# Patient Record
Sex: MALE | URBAN - METROPOLITAN AREA
[De-identification: names, ages, dates, MRNs, and addresses within clinical notes are randomized per-mention and may not be internally consistent; named-entity substitution may affect disease eponyms.]

---

## 2017-04-07 ENCOUNTER — NEW REFERRAL (OUTPATIENT)
Dept: URBAN - METROPOLITAN AREA CLINIC 87 | Facility: CLINIC | Age: 27
End: 2017-04-07

## 2017-04-07 DIAGNOSIS — H35.463: ICD-10-CM

## 2017-04-07 DIAGNOSIS — H43.393: ICD-10-CM

## 2017-04-07 DIAGNOSIS — H52.203: ICD-10-CM

## 2017-04-07 DIAGNOSIS — H16.223: ICD-10-CM

## 2017-04-07 DIAGNOSIS — H52.13: ICD-10-CM

## 2017-04-07 DIAGNOSIS — M35.00: ICD-10-CM

## 2017-04-07 PROCEDURE — 92225 OPHTHALMOSCOPY (INITIAL): CPT

## 2017-04-07 PROCEDURE — 4040F PNEUMOC VAC/ADMIN/RCVD: CPT | Mod: 8P

## 2017-04-07 PROCEDURE — 92134 CPTRZ OPH DX IMG PST SGM RTA: CPT

## 2017-04-07 PROCEDURE — 99244 OFF/OP CNSLTJ NEW/EST MOD 40: CPT

## 2017-04-07 ASSESSMENT — VISUAL ACUITY
OS_CC: 20/25
OD_CC: 20/20
OD_PH: 20/20-2
OS_CC: 20/30-1
OD_CC: 20/30-1
OS_PH: 20/20

## 2017-04-07 ASSESSMENT — TONOMETRY
OD_IOP_MMHG: 17
OS_IOP_MMHG: 15

## 2018-01-26 ENCOUNTER — PROBLEM (OUTPATIENT)
Dept: URBAN - METROPOLITAN AREA CLINIC 87 | Facility: CLINIC | Age: 28
End: 2018-01-26

## 2018-01-26 DIAGNOSIS — H33.311: ICD-10-CM

## 2018-01-26 DIAGNOSIS — H43.393: ICD-10-CM

## 2018-01-26 DIAGNOSIS — H35.463: ICD-10-CM

## 2018-01-26 DIAGNOSIS — H53.19: ICD-10-CM

## 2018-01-26 DIAGNOSIS — H16.223: ICD-10-CM

## 2018-01-26 DIAGNOSIS — H35.412: ICD-10-CM

## 2018-01-26 DIAGNOSIS — H52.203: ICD-10-CM

## 2018-01-26 DIAGNOSIS — H52.13: ICD-10-CM

## 2018-01-26 DIAGNOSIS — M35.00: ICD-10-CM

## 2018-01-26 PROCEDURE — G8427 DOCREV CUR MEDS BY ELIG CLIN: HCPCS

## 2018-01-26 PROCEDURE — 1036F TOBACCO NON-USER: CPT

## 2018-01-26 PROCEDURE — 4040F PNEUMOC VAC/ADMIN/RCVD: CPT | Mod: 8P

## 2018-01-26 PROCEDURE — 92226 OPHTHALMOSCOPY (SUB): CPT

## 2018-01-26 PROCEDURE — 92014 COMPRE OPH EXAM EST PT 1/>: CPT

## 2018-01-26 ASSESSMENT — VISUAL ACUITY
OD_CC: 20/20
OD_CC: 20/20
OS_CC: 20/20-2

## 2018-01-26 ASSESSMENT — TONOMETRY
OD_IOP_MMHG: 21
OS_IOP_MMHG: 20

## 2018-04-23 LAB — HCV AB SER-ACNC: NEGATIVE

## 2020-07-25 ENCOUNTER — APPOINTMENT (EMERGENCY)
Dept: RADIOLOGY | Facility: HOSPITAL | Age: 30
End: 2020-07-25

## 2020-07-25 ENCOUNTER — HOSPITAL ENCOUNTER (EMERGENCY)
Facility: HOSPITAL | Age: 30
Discharge: HOME | End: 2020-07-25
Attending: EMERGENCY MEDICINE | Admitting: EMERGENCY MEDICINE

## 2020-07-25 VITALS
TEMPERATURE: 98.3 F | WEIGHT: 146.83 LBS | RESPIRATION RATE: 16 BRPM | OXYGEN SATURATION: 100 % | BODY MASS INDEX: 21.75 KG/M2 | DIASTOLIC BLOOD PRESSURE: 86 MMHG | SYSTOLIC BLOOD PRESSURE: 126 MMHG | HEART RATE: 90 BPM | HEIGHT: 69 IN

## 2020-07-25 DIAGNOSIS — R55 NEAR SYNCOPE: ICD-10-CM

## 2020-07-25 DIAGNOSIS — R42 LIGHTHEADEDNESS: ICD-10-CM

## 2020-07-25 DIAGNOSIS — F41.9 ANXIETY: Primary | ICD-10-CM

## 2020-07-25 LAB
ALBUMIN SERPL BCP-MCNC: 4.5 G/DL (ref 3.5–5)
ALP SERPL-CCNC: 54 U/L (ref 46–116)
ALT SERPL W P-5'-P-CCNC: 45 U/L (ref 12–78)
ANION GAP SERPL CALCULATED.3IONS-SCNC: 10 MMOL/L (ref 4–13)
AST SERPL W P-5'-P-CCNC: 29 U/L (ref 5–45)
ATRIAL RATE: 92 BPM
ATRIAL RATE: 98 BPM
BASOPHILS # BLD AUTO: 0.02 THOUSANDS/ΜL (ref 0–0.1)
BASOPHILS NFR BLD AUTO: 0 % (ref 0–1)
BILIRUB SERPL-MCNC: 0.4 MG/DL (ref 0.2–1)
BUN SERPL-MCNC: 6 MG/DL (ref 5–25)
CALCIUM SERPL-MCNC: 9.7 MG/DL (ref 8.3–10.1)
CHLORIDE SERPL-SCNC: 104 MMOL/L (ref 100–108)
CO2 SERPL-SCNC: 26 MMOL/L (ref 21–32)
CREAT SERPL-MCNC: 1.04 MG/DL (ref 0.6–1.3)
EOSINOPHIL # BLD AUTO: 0.17 THOUSAND/ΜL (ref 0–0.61)
EOSINOPHIL NFR BLD AUTO: 4 % (ref 0–6)
ERYTHROCYTE [DISTWIDTH] IN BLOOD BY AUTOMATED COUNT: 12.3 % (ref 11.6–15.1)
GFR SERPL CREATININE-BSD FRML MDRD: 96 ML/MIN/1.73SQ M
GLUCOSE SERPL-MCNC: 82 MG/DL (ref 65–140)
HCT VFR BLD AUTO: 46.2 % (ref 36.5–49.3)
HGB BLD-MCNC: 15.6 G/DL (ref 12–17)
IMM GRANULOCYTES # BLD AUTO: 0.02 THOUSAND/UL (ref 0–0.2)
IMM GRANULOCYTES NFR BLD AUTO: 0 % (ref 0–2)
LYMPHOCYTES # BLD AUTO: 1.38 THOUSANDS/ΜL (ref 0.6–4.47)
LYMPHOCYTES NFR BLD AUTO: 28 % (ref 14–44)
MAGNESIUM SERPL-MCNC: 2.1 MG/DL (ref 1.6–2.6)
MCH RBC QN AUTO: 32 PG (ref 26.8–34.3)
MCHC RBC AUTO-ENTMCNC: 33.8 G/DL (ref 31.4–37.4)
MCV RBC AUTO: 95 FL (ref 82–98)
MONOCYTES # BLD AUTO: 0.38 THOUSAND/ΜL (ref 0.17–1.22)
MONOCYTES NFR BLD AUTO: 8 % (ref 4–12)
NEUTROPHILS # BLD AUTO: 2.92 THOUSANDS/ΜL (ref 1.85–7.62)
NEUTS SEG NFR BLD AUTO: 60 % (ref 43–75)
NRBC BLD AUTO-RTO: 0 /100 WBCS
P AXIS: 76 DEGREES
P AXIS: 77 DEGREES
PHOSPHATE SERPL-MCNC: 2.1 MG/DL (ref 2.7–4.5)
PLATELET # BLD AUTO: 307 THOUSANDS/UL (ref 149–390)
PMV BLD AUTO: 10.4 FL (ref 8.9–12.7)
POTASSIUM SERPL-SCNC: 3.9 MMOL/L (ref 3.5–5.3)
PR INTERVAL: 156 MS
PR INTERVAL: 158 MS
PROT SERPL-MCNC: 8.4 G/DL (ref 6.4–8.2)
QRS AXIS: 78 DEGREES
QRS AXIS: 79 DEGREES
QRSD INTERVAL: 84 MS
QRSD INTERVAL: 86 MS
QT INTERVAL: 320 MS
QT INTERVAL: 324 MS
QTC INTERVAL: 395 MS
QTC INTERVAL: 413 MS
RBC # BLD AUTO: 4.88 MILLION/UL (ref 3.88–5.62)
SODIUM SERPL-SCNC: 140 MMOL/L (ref 136–145)
T WAVE AXIS: 61 DEGREES
T WAVE AXIS: 70 DEGREES
TROPONIN I SERPL-MCNC: <0.02 NG/ML
TSH SERPL DL<=0.05 MIU/L-ACNC: 1.34 UIU/ML (ref 0.36–3.74)
VENTRICULAR RATE: 92 BPM
VENTRICULAR RATE: 98 BPM
WBC # BLD AUTO: 4.89 THOUSAND/UL (ref 4.31–10.16)

## 2020-07-25 PROCEDURE — 96374 THER/PROPH/DIAG INJ IV PUSH: CPT

## 2020-07-25 PROCEDURE — 93005 ELECTROCARDIOGRAM TRACING: CPT

## 2020-07-25 PROCEDURE — 93010 ELECTROCARDIOGRAM REPORT: CPT | Performed by: INTERNAL MEDICINE

## 2020-07-25 PROCEDURE — 84100 ASSAY OF PHOSPHORUS: CPT | Performed by: EMERGENCY MEDICINE

## 2020-07-25 PROCEDURE — 85025 COMPLETE CBC W/AUTO DIFF WBC: CPT | Performed by: EMERGENCY MEDICINE

## 2020-07-25 PROCEDURE — 84443 ASSAY THYROID STIM HORMONE: CPT | Performed by: EMERGENCY MEDICINE

## 2020-07-25 PROCEDURE — 83735 ASSAY OF MAGNESIUM: CPT | Performed by: EMERGENCY MEDICINE

## 2020-07-25 PROCEDURE — 36415 COLL VENOUS BLD VENIPUNCTURE: CPT | Performed by: EMERGENCY MEDICINE

## 2020-07-25 PROCEDURE — 99285 EMERGENCY DEPT VISIT HI MDM: CPT | Performed by: EMERGENCY MEDICINE

## 2020-07-25 PROCEDURE — 80053 COMPREHEN METABOLIC PANEL: CPT | Performed by: EMERGENCY MEDICINE

## 2020-07-25 PROCEDURE — 71045 X-RAY EXAM CHEST 1 VIEW: CPT

## 2020-07-25 PROCEDURE — 96361 HYDRATE IV INFUSION ADD-ON: CPT

## 2020-07-25 PROCEDURE — 99284 EMERGENCY DEPT VISIT MOD MDM: CPT

## 2020-07-25 PROCEDURE — 84484 ASSAY OF TROPONIN QUANT: CPT | Performed by: EMERGENCY MEDICINE

## 2020-07-25 RX ORDER — LORAZEPAM 2 MG/ML
0.5 INJECTION INTRAMUSCULAR ONCE
Status: COMPLETED | OUTPATIENT
Start: 2020-07-25 | End: 2020-07-25

## 2020-07-25 RX ORDER — MECLIZINE HYDROCHLORIDE 25 MG/1
25 TABLET ORAL ONCE
Status: COMPLETED | OUTPATIENT
Start: 2020-07-25 | End: 2020-07-25

## 2020-07-25 RX ORDER — SERTRALINE HYDROCHLORIDE 25 MG/1
25 TABLET, FILM COATED ORAL DAILY
COMMUNITY

## 2020-07-25 RX ADMIN — SODIUM CHLORIDE 1000 ML: 0.9 INJECTION, SOLUTION INTRAVENOUS at 11:35

## 2020-07-25 RX ADMIN — LORAZEPAM 0.5 MG: 2 INJECTION INTRAMUSCULAR; INTRAVENOUS at 13:35

## 2020-07-25 RX ADMIN — MECLIZINE HYDROCHLORIDE 25 MG: 25 TABLET ORAL at 11:32

## 2020-07-25 NOTE — ED PROVIDER NOTES
History  Chief Complaint   Patient presents with    Dizziness     Patient presents to ED with co "dizziness, light headed, shaky, feeling like I am going to pass out " Patient reports having 5 beers last night and waking up feeling like this  28 y/o male presents to the ED for dizziness x last few hours  Patient states that he has had intermittent lightheadedness and anxiety  States that he feels like he is going to pass out  He denies any n/v, cp, sob, abd pain, urinary symptoms, d/c, cough, or f/c  He reports that he drank 5-6 beers last night  States that he is a social drinker  He does report that he has a history of similar symptoms in the past and was seen at another hospital 1 week ago for the same  He states that the symptoms were after drinking, similar to this episode  He states that he had blood work done at that time which was unremarkable  He has not tried anything for the symptoms  No other complaints  History provided by:  Patient  Dizziness   Quality:  Lightheadedness  Severity:  Moderate  Onset quality:  Sudden  Timing:  Constant  Progression:  Unchanged  Chronicity:  Recurrent  Context: standing up    Relieved by:  None tried  Worsened by:  Standing up and movement  Ineffective treatments:  None tried  Associated symptoms: no chest pain, no diarrhea, no headaches, no nausea, no shortness of breath, no vomiting and no weakness        Prior to Admission Medications   Prescriptions Last Dose Informant Patient Reported? Taking?   sertraline (ZOLOFT) 25 mg tablet   Yes Yes   Sig: Take 25 mg by mouth daily      Facility-Administered Medications: None       Past Medical History:   Diagnosis Date    Sjogren's syndrome ([facility] [address] )        History reviewed  No pertinent surgical history  History reviewed  No pertinent family history  I have reviewed and agree with the history as documented      E-Cigarette/Vaping    E-Cigarette Use Never User      E-Cigarette/Vaping Substances     Social History Tobacco Use    Smoking status: Never Smoker    Smokeless tobacco: Never Used   Substance Use Topics    Alcohol use: Yes     Frequency: Monthly or less    Drug use: Never       Review of Systems   Constitutional: Negative for chills and fever  HENT: Negative for congestion, ear pain and sore throat  Eyes: Negative for pain and visual disturbance  Respiratory: Negative for cough, shortness of breath and wheezing  Cardiovascular: Negative for chest pain and leg swelling  Gastrointestinal: Negative for abdominal pain, diarrhea, nausea and vomiting  Genitourinary: Negative for dysuria, frequency, hematuria and urgency  Musculoskeletal: Negative for neck pain and neck stiffness  Skin: Negative for rash and wound  Neurological: Positive for dizziness and light-headedness  Negative for weakness, numbness and headaches  Psychiatric/Behavioral: Negative for agitation and confusion  The patient is nervous/anxious  All other systems reviewed and are negative  Physical Exam  Physical Exam   Constitutional: He is oriented to person, place, and time  He appears well-developed and well-nourished  HENT:   Head: Normocephalic and atraumatic  No nystagmus    Eyes: Pupils are equal, round, and reactive to light  EOM are normal    Neck: Normal range of motion  Neck supple  Cardiovascular: Normal rate and regular rhythm  Pulmonary/Chest: Effort normal and breath sounds normal    Abdominal: Soft  Bowel sounds are normal    Musculoskeletal: Normal range of motion  Neurological: He is alert and oriented to person, place, and time  No focal deficits   Skin: Skin is warm and dry  Nursing note and vitals reviewed        Vital Signs  ED Triage Vitals [07/25/20 1104]   Temperature Pulse Respirations Blood Pressure SpO2   98 3 °F (36 8 °C) (!) 106 17 (!) 152/103 100 %      Temp Source Heart Rate Source Patient Position - Orthostatic VS BP Location FiO2 (%)   Oral Monitor Sitting Right arm -- Pain Score       --           Vitals:    07/25/20 1315 07/25/20 1345 07/25/20 1400 07/25/20 1430   BP: 129/82 126/84 124/87 126/86   Pulse: 86 83 87 90   Patient Position - Orthostatic VS: Lying Lying Sitting          Visual Acuity  Visual Acuity      Most Recent Value   L Pupil Size (mm)  2   R Pupil Size (mm)  2          ED Medications  Medications   meclizine (ANTIVERT) tablet 25 mg (25 mg Oral Given 7/25/20 1132)   sodium chloride 0 9 % bolus 1,000 mL (0 mL Intravenous Stopped 7/25/20 1335)   LORazepam (ATIVAN) injection 0 5 mg (0 5 mg Intravenous Given 7/25/20 1335)       Diagnostic Studies  Results Reviewed     Procedure Component Value Units Date/Time    Magnesium [[id number]]  (Normal) Collected:  07/25/20 1132    Lab Status:  Final result Specimen:  Blood from Arm, Right Updated:  07/25/20 1211     Magnesium 2 1 mg/dL     Phosphorus [[id number]]  (Abnormal) Collected:  07/25/20 1132    Lab Status:  Final result Specimen:  Blood from Arm, Right Updated:  07/25/20 1211     Phosphorus 2 1 mg/dL     TSH, 3rd generation with Free T4 reflex [[id number]]  (Normal) Collected:  07/25/20 1132    Lab Status:  Final result Specimen:  Blood from Arm, Right Updated:  07/25/20 1211     TSH 3RD GENERATON 1 345 uIU/mL     Narrative:       Patients undergoing fluorescein dye angiography may retain small amounts of fluorescein in the body for 48-72 hours post procedure  Samples containing fluorescein can produce falsely depressed TSH values  If the patient had this procedure,a specimen should be resubmitted post fluorescein clearance        Troponin I [[id number]]  (Normal) Collected:  07/25/20 1132    Lab Status:  Final result Specimen:  Blood from Arm, Right Updated:  07/25/20 1204     Troponin I <0 02 ng/mL     Comprehensive metabolic panel [[id number]]  (Abnormal) Collected:  07/25/20 1132    Lab Status:  Final result Specimen:  Blood from Arm, Right Updated:  07/25/20 1202     Sodium 140 mmol/L      Potassium 3 9 mmol/L Chloride 104 mmol/L      CO2 26 mmol/L      ANION GAP 10 mmol/L      BUN 6 mg/dL      Creatinine 1 04 mg/dL      Glucose 82 mg/dL      Calcium 9 7 mg/dL      AST 29 U/L      ALT 45 U/L      Alkaline Phosphatase 54 U/L      Total Protein 8 4 g/dL      Albumin 4 5 g/dL      Total Bilirubin 0 40 mg/dL      eGFR 96 ml/min/1 73sq m     Narrative:       National Kidney Disease Foundation guidelines for Chronic Kidney Disease (CKD):     Stage 1 with normal or high GFR (GFR > 90 mL/min/1 73 square meters)    Stage 2 Mild CKD (GFR = 60-89 mL/min/1 73 square meters)    Stage 3A Moderate CKD (GFR = 45-59 mL/min/1 73 square meters)    Stage 3B Moderate CKD (GFR = 30-44 mL/min/1 73 square meters)    Stage 4 Severe CKD (GFR = 15-29 mL/min/1 73 square meters)    Stage 5 End Stage CKD (GFR <15 mL/min/1 73 square meters)  Note: GFR calculation is accurate only with a steady state creatinine    CBC and differential [[id number]] Collected:  07/25/20 1132    Lab Status:  Final result Specimen:  Blood from Arm, Right Updated:  07/25/20 1144     WBC 4 89 Thousand/uL      RBC 4 88 Million/uL      Hemoglobin 15 6 g/dL      Hematocrit 46 2 %      MCV 95 fL      MCH 32 0 pg      MCHC 33 8 g/dL      RDW 12 3 %      MPV 10 4 fL      Platelets 561 Thousands/uL      nRBC 0 /100 WBCs      Neutrophils Relative 60 %      Immat GRANS % 0 %      Lymphocytes Relative 28 %      Monocytes Relative 8 %      Eosinophils Relative 4 %      Basophils Relative 0 %      Neutrophils Absolute 2 92 Thousands/µL      Immature Grans Absolute 0 02 Thousand/uL      Lymphocytes Absolute 1 38 Thousands/µL      Monocytes Absolute 0 38 Thousand/µL      Eosinophils Absolute 0 17 Thousand/µL      Basophils Absolute 0 02 Thousands/µL                  XR chest portable   ED Interpretation by Lissett Jansen DO (07/25 8747)   NAP       Final Result by Francesca Tipton MD (07/25 2669)      No acute cardiopulmonary disease              Workstation performed: [id number]                    Procedures  ECG 12 Lead Documentation Only  Date/Time: 7/25/2020 11:24 AM  Performed by: Zane Amato DO  Authorized by: Zane Amato DO     Patient location:  ED  Rate:     ECG rate:  98    ECG rate assessment: normal    Rhythm:     Rhythm: sinus rhythm    Ectopy:     Ectopy: none    QRS:     QRS axis:  Normal    QRS intervals:  Normal  ST segments:     ST segments:  Normal  T waves:     T waves: normal               ED Course       US AUDIT      Most Recent Value   Initial Alcohol Screen: US AUDIT-C    1  How often do you have a drink containing alcohol? 1 Filed at: 07/25/2020 1103   2  How many drinks containing alcohol do you have on a typical day you are drinking? 0 Filed at: 07/25/2020 1103   3a  Male UNDER 65: How often do you have five or more drinks on one occasion? 1 Filed at: 07/25/2020 1103   Audit-C Score  2 Filed at: 07/25/2020 1103                  VIRGILIO/DAST-10      Most Recent Value   How many times in the past year have you    Used an illegal drug or used a prescription medication for non-medical reasons? Never Filed at: 07/25/2020 1103                                MDM  Number of Diagnoses or Management Options  Anxiety: new and requires workup  Lightheadedness: new and requires workup  Near syncope: new and requires workup  Diagnosis management comments: Patient with lightheadedness and near syncope likely secondary to dehydration from alcohol yesterday  Will get labs, EKG, TSH, and trial meclizine and fluids for symptom relief  Patient reevaluated and feels improved  Patient updated on results of tests  Discharge instructions given including follow-up, and return precautions  Patient demonstrates verbal understanding and agrees with plan  Patient instructed not to drink alcohol 2/2 recurrent symptoms after drinking          Amount and/or Complexity of Data Reviewed  Clinical lab tests: ordered and reviewed  Tests in the radiology section of CPT®: ordered and reviewed  Tests in the medicine section of CPT®: ordered and reviewed  Discussion of test results with the performing providers: yes  Decide to obtain previous medical records or to obtain history from someone other than the patient: yes  Obtain history from someone other than the patient: yes  Review and summarize past medical records: yes  Discuss the patient with other providers: yes  Independent visualization of images, tracings, or specimens: yes    Patient Progress  Patient progress: improved        Disposition  Final diagnoses:   Anxiety   Near syncope   Lightheadedness     Time reflects when diagnosis was documented in both MDM as applicable and the Disposition within this note     Time User Action Codes Description Comment    7/25/2020  2:29 PM [place] Com[place] Add [F41 9] Anxiety     7/25/2020  2:29 PM [place] Com[place] Add [R55] Near syncope     7/25/2020  2:29 PM [facility], [facility] [R42] [address] [facility]       ED Disposition     ED Disposition Condition Date/Time Comment    Discharge Stable Sat Jul 25, 2020  2:28 PM [place] Shirley discharge to home/self care  Follow-up Information     Follow up With Specialties Details Why Contact Info Additional Information    your family doctor  Go to  on Tuesday, as scheduled      [facility] Emergency Department Emergency Medicine Go to  immediately for any new or worsening symptoms [address] [facility] [address] ED, [address] [place], [place], [address]          Discharge Medication List as of 7/25/2020  2:30 PM      CONTINUE these medications which have NOT CHANGED    Details   sertraline (ZOLOFT) 25 mg tablet Take 25 mg by mouth daily, Historical Med           No discharge procedures on file      PDMP Review     None          ED Provider  Electronically Signed by           Mary Jo Foss DO  07/27/20 0105

## 2020-07-25 NOTE — ED NOTES
Patient states medications did not help symptoms  Dr Cherylene Prudent made aware        Aleksander Grigsby RN  07/25/20 4846

## 2023-08-09 ENCOUNTER — HOSPITAL ENCOUNTER (OUTPATIENT)
Dept: ULTRASOUND IMAGING | Facility: HOSPITAL | Age: 33
Discharge: HOME | End: 2023-08-09
Payer: COMMERCIAL

## 2023-08-09 DIAGNOSIS — R59.0 CERVICAL LYMPHADENOPATHY: ICD-10-CM

## 2023-08-09 PROCEDURE — 76536 US EXAM OF HEAD AND NECK: CPT

## 2023-08-15 ENCOUNTER — OFFICE VISIT (OUTPATIENT)
Age: 33
End: 2023-08-15
Payer: COMMERCIAL

## 2023-08-15 VITALS
RESPIRATION RATE: 18 BRPM | TEMPERATURE: 96.8 F | HEART RATE: 71 BPM | BODY MASS INDEX: 22.95 KG/M2 | DIASTOLIC BLOOD PRESSURE: 88 MMHG | WEIGHT: 155.4 LBS | OXYGEN SATURATION: 100 % | SYSTOLIC BLOOD PRESSURE: 122 MMHG

## 2023-08-15 DIAGNOSIS — H66.91 RIGHT OTITIS MEDIA, UNSPECIFIED OTITIS MEDIA TYPE: Primary | ICD-10-CM

## 2023-08-15 DIAGNOSIS — H60.501 ACUTE OTITIS EXTERNA OF RIGHT EAR, UNSPECIFIED TYPE: ICD-10-CM

## 2023-08-15 PROCEDURE — 99213 OFFICE O/P EST LOW 20 MIN: CPT | Performed by: EMERGENCY MEDICINE

## 2023-08-15 RX ORDER — AMOXICILLIN 500 MG/1
500 CAPSULE ORAL 3 TIMES DAILY
Qty: 21 CAPSULE | Refills: 0 | Status: SHIPPED | OUTPATIENT
Start: 2023-08-15 | End: 2023-08-22

## 2023-08-15 RX ORDER — FLUTICASONE PROPIONATE 50 MCG
SPRAY, SUSPENSION (ML) NASAL
COMMUNITY
Start: 2023-08-08

## 2023-08-15 RX ORDER — MELATONIN
1000 DAILY
COMMUNITY

## 2023-08-15 RX ORDER — NEOMYCIN SULFATE, POLYMYXIN B SULFATE AND HYDROCORTISONE 10; 3.5; 1 MG/ML; MG/ML; [USP'U]/ML
4 SUSPENSION/ DROPS AURICULAR (OTIC) 4 TIMES DAILY
Qty: 10 ML | Refills: 0 | Status: SHIPPED | OUTPATIENT
Start: 2023-08-15

## 2023-08-15 NOTE — PROGRESS NOTES
[facility]        NAME: Beverly Arredondo is a 35 y.o. male  : 1990    MRN: [id number]  DATE: August 15, 2023  TIME: 2:41 PM    Assessment and Plan   Right otitis media, unspecified otitis media type [H66.91]  1. Right otitis media, unspecified otitis media type  amoxicillin (AMOXIL) 500 mg capsule    neomycin-polymyxin-hydrocortisone (CORTISPORIN) 0.35%-10,000 units/mL-1% otic suspension      2. Acute otitis externa of right ear, unspecified type  neomycin-polymyxin-hydrocortisone (CORTISPORIN) 0.35%-10,000 units/mL-1% otic suspension            Patient Instructions   Patient Instructions   1. Meds as instructed  2. Tylenol/Motrin for pain  3. F/u with PCP in 2-3 days        Follow up with PCP in 3-5 days. Proceed to  ER if symptoms worsen. Chief Complaint     Chief Complaint   Patient presents with   • Earache     Pt states he has had a right sided earache since this morning. History of Present Illness       26-year-old male with a chief complaint of right ear pain. Patient denies any fever. Review of Systems   Review of Systems   Constitutional: Negative for chills and fever. HENT: Positive for ear pain. Negative for congestion, ear discharge and rhinorrhea. Eyes: Negative for discharge and visual disturbance. Respiratory: Negative for shortness of breath and wheezing. Cardiovascular: Negative for chest pain and palpitations. Gastrointestinal: Negative for abdominal pain and vomiting. Endocrine: Negative for polydipsia and polyuria. Genitourinary: Negative for dysuria and hematuria. Musculoskeletal: Negative for arthralgias, gait problem and neck stiffness. Skin: Negative for rash and wound. Neurological: Negative for dizziness and headaches. Psychiatric/Behavioral: Negative for confusion and suicidal ideas.          Current Medications       Current Outpatient Medications:   •  amoxicillin (AMOXIL) 500 mg capsule, Take 1 capsule (500 mg total) by mouth 3 (three) times a day for 7 days, Disp: 21 capsule, Rfl: 0  •  cholecalciferol (VITAMIN D3) 1,000 units tablet, Take 1,000 Units by mouth daily 5,000 units daily, Disp: , Rfl:   •  cyanocobalamin (VITAMIN B-12) 1000 MCG tablet, Take 100 mcg by mouth daily, Disp: , Rfl:   •  esomeprazole (NexIUM) 20 mg capsule, Take 20 mg by mouth daily, Disp: , Rfl:   •  fluticasone (FLONASE) 50 mcg/act nasal spray, , Disp: , Rfl:   •  Multiple Vitamin (MULTI-VITAMIN) tablet, Take by mouth, Disp: , Rfl:   •  neomycin-polymyxin-hydrocortisone (CORTISPORIN) 0.35%-10,000 units/mL-1% otic suspension, Administer 4 drops to the right ear 4 (four) times a day, Disp: 10 mL, Rfl: 0  •  sertraline (ZOLOFT) 25 mg tablet, Take 25 mg by mouth daily (Patient not taking: Reported on 8/15/2023), Disp: , Rfl:     Current Allergies     Allergies as of 08/15/2023   • (No Known Allergies)            The following portions of the patient's history were reviewed and updated as appropriate: allergies, current medications, past family history, past medical history, past social history, past surgical history and problem list.     Past Medical History:   Diagnosis Date   • Sjogren's syndrome ([address])        History reviewed. No pertinent surgical history. History reviewed. No pertinent family history. Medications have been verified. Objective   /88   Pulse 71   Temp (!) 96.8 °F (36 °C)   Resp 18   Wt 70.5 kg (155 lb 6.4 oz)   SpO2 100%   BMI 22.95 kg/m²        Physical Exam     Physical Exam  Vitals and nursing note reviewed. Constitutional:       Appearance: Normal appearance. Comments: 22-year-old male sitting on exam table complaining of right ear pain   HENT:      Head: Normocephalic and atraumatic. Left Ear: Tympanic membrane, ear canal and external ear normal.      Ears:      Comments: Right ear:  There is mild erythema of the medial canal and some edema of the external canal.  Patient has some tenderness when I insert the otoscope. Nose: Nose normal.   Eyes:      Extraocular Movements: Extraocular movements intact. Conjunctiva/sclera: Conjunctivae normal.   Cardiovascular:      Rate and Rhythm: Normal rate. Pulmonary:      Effort: Pulmonary effort is normal.   Musculoskeletal:         General: Normal range of motion. Cervical back: Neck supple. Skin:     General: Skin is warm and dry. Neurological:      General: No focal deficit present. Mental Status: He is alert and oriented to person, place, and time.    Psychiatric:         Mood and Affect: Mood normal.

## 2023-08-30 ENCOUNTER — TELEPHONE (OUTPATIENT)
Dept: ADMINISTRATIVE | Facility: OTHER | Age: 33
End: 2023-08-30

## 2023-08-30 NOTE — TELEPHONE ENCOUNTER
----- Message from Cait Santiago LPN sent at 2/25/4517 10:12 AM EDT -----  Regarding: Hep C Screening  08/30/23 10:12 AM    Hello, our patient Sonal Taylor has had Hepatitis C completed/performed. Please assist in updating the patient chart by pulling the Care Everywhere (CE) document. The date of service is 04/23/2018.      Thank you,  Cait Santiago LPN  [facility]

## 2023-08-31 NOTE — TELEPHONE ENCOUNTER
Upon review of the In Basket request we were able to locate, review, and update the patient chart as requested for Hepatitis C . Any additional questions or concerns should be emailed to the Practice Liaisons via the appropriate education email address, please do not reply via In Basket.     Thank you  Ludivina Glasgow MA

## 2023-09-05 ENCOUNTER — OFFICE VISIT (OUTPATIENT)
Dept: INTERNAL MEDICINE CLINIC | Facility: CLINIC | Age: 33
End: 2023-09-05
Payer: COMMERCIAL

## 2023-09-05 VITALS
SYSTOLIC BLOOD PRESSURE: 122 MMHG | WEIGHT: 153.4 LBS | HEART RATE: 80 BPM | DIASTOLIC BLOOD PRESSURE: 68 MMHG | RESPIRATION RATE: 16 BRPM | OXYGEN SATURATION: 98 % | HEIGHT: 69 IN | BODY MASS INDEX: 22.72 KG/M2

## 2023-09-05 DIAGNOSIS — E55.9 VITAMIN D DEFICIENCY: ICD-10-CM

## 2023-09-05 DIAGNOSIS — N62 GYNECOMASTIA: ICD-10-CM

## 2023-09-05 DIAGNOSIS — R61 NIGHT SWEATS: ICD-10-CM

## 2023-09-05 DIAGNOSIS — M35.01 SJOGREN'S SYNDROME WITH KERATOCONJUNCTIVITIS SICCA (HCC): Primary | ICD-10-CM

## 2023-09-05 DIAGNOSIS — Z13.220 ENCOUNTER FOR LIPID SCREENING FOR CARDIOVASCULAR DISEASE: ICD-10-CM

## 2023-09-05 DIAGNOSIS — K21.9 GASTROESOPHAGEAL REFLUX DISEASE WITHOUT ESOPHAGITIS: ICD-10-CM

## 2023-09-05 DIAGNOSIS — Z13.1 DIABETES MELLITUS SCREENING: ICD-10-CM

## 2023-09-05 DIAGNOSIS — R59.0 SUBMANDIBULAR LYMPHADENOPATHY: ICD-10-CM

## 2023-09-05 DIAGNOSIS — Z13.6 ENCOUNTER FOR LIPID SCREENING FOR CARDIOVASCULAR DISEASE: ICD-10-CM

## 2023-09-05 DIAGNOSIS — F41.9 ANXIETY: ICD-10-CM

## 2023-09-05 DIAGNOSIS — R22.1 NECK SWELLING: ICD-10-CM

## 2023-09-05 PROCEDURE — 99204 OFFICE O/P NEW MOD 45 MIN: CPT | Performed by: INTERNAL MEDICINE

## 2023-09-05 NOTE — PROGRESS NOTES
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;      PMH for Sjogren's disorder, diagnosed few years ago, involving his eyes; reports associated dry mouth and dry eyes, uses eye drops, cannot use artifical saliva as it causes him too much saliva, says his symptoms are stable for now; has GERD and will be having an EGD next month, taking Nexium prn;    Reports L submandibular lymphadenopathy and nightly sweats, US done in the past and normal; denies any fever; no other lymphadenopathy on exam; recent illness noted; will obtain CT neck, check labs including cbc, CMP, TSH, a1c, lipid, vit d, urine;     US breasts and mammogram for b/l breast swelling; says his chest has always been large, dad has similar issue;     Quality Measures:     Depression Screening and Follow-up Plan: Patient was screened for depression during today's encounter. They screened negative with a PHQ-2 score of 2. Return in about 2 months (around 11/5/2023). No problem-specific Assessment & Plan notes found for this encounter. Diagnoses and all orders for this visit:    Sjogren's syndrome with keratoconjunctivitis sicca (HCC)  -     CBC and differential; Future  -     Comprehensive metabolic panel; Future  -     CT soft tissue neck w contrast; Future    Gastroesophageal reflux disease without esophagitis  -     CBC and differential; Future    Night sweats  -     TSH, 3rd generation with Free T4 reflex; Future  -     Urinalysis, Screen  -     CT soft tissue neck w contrast; Future    Vitamin D deficiency  -     Vitamin D 25 hydroxy; Future    Neck swelling  -     CT soft tissue neck w contrast; Future    Diabetes mellitus screening  -     Hemoglobin A1C; Future    Anxiety  -     TSH, 3rd generation with Free T4 reflex;  Future    Encounter for lipid screening for cardiovascular disease  -     Lipid Panel with Direct LDL reflex; Future    Submandibular lymphadenopathy  -     CT soft tissue neck w contrast; Future    Gynecomastia  -     Mammo diagnostic bilateral w cad; Future          Subjective:      Patient ID: Tory Welch is a 35 y.o. male. Here to establish c      ALLERGIES:  No Known Allergies    CURRENT MEDICATIONS:    Current Outpatient Medications:   •  cholecalciferol (VITAMIN D3) 1,000 units tablet, Take 1,000 Units by mouth daily 5,000 units daily, Disp: , Rfl:   •  esomeprazole (NexIUM) 20 mg capsule, Take 20 mg by mouth daily, Disp: , Rfl:   •  fluticasone (FLONASE) 50 mcg/act nasal spray, , Disp: , Rfl:   •  Multiple Vitamin (MULTI-VITAMIN) tablet, Take by mouth, Disp: , Rfl:   •  neomycin-polymyxin-hydrocortisone (CORTISPORIN) 0.35%-10,000 units/mL-1% otic suspension, Administer 4 drops to the right ear 4 (four) times a day, Disp: 10 mL, Rfl: 0    ACTIVE PROBLEM LIST:  Patient Active Problem List   Diagnosis   • GERD (gastroesophageal reflux disease)   • Sjogren's syndrome (HCC)       PAST MEDICAL HISTORY:  Past Medical History:   Diagnosis Date   • Allergic    • Anxiety    • GERD (gastroesophageal reflux disease)    • Sjogren's syndrome ([address])        PAST SURGICAL HISTORY:  History reviewed. No pertinent surgical history. FAMILY HISTORY:  History reviewed. No pertinent family history. SOCIAL HISTORY:  Social History     Socioeconomic History   • Marital status: Single     Spouse name: Not on file   • Number of children: Not on file   • Years of education: Not on file   • Highest education level: Not on file   Occupational History   • Not on file   Tobacco Use   • Smoking status: Never   • Smokeless tobacco: Never   Vaping Use   • Vaping Use: Never used   Substance and Sexual Activity   • Alcohol use:  Yes     Alcohol/week: 8.0 standard drinks of alcohol     Types: 8 Cans of beer per week     Comment: socially   • Drug use: Never   • Sexual activity: Yes     Partners: Female     Birth control/protection: Condom Female Other Topics Concern   • Not on file   Social History Narrative   • Not on file     Social Determinants of Health     Financial Resource Strain: Not on file   Food Insecurity: Not on file   Transportation Needs: Not on file   Physical Activity: Not on file   Stress: Not on file   Social Connections: Not on file   Intimate Partner Violence: Not on file   Housing Stability: Not on file       Review of Systems   Constitutional: Positive for chills (night sweats). Negative for fever. HENT: Negative for ear pain and sore throat. Neck swelling   Eyes: Negative for pain and visual disturbance. Dry eyes   Dry mouth     Respiratory: Negative for cough and shortness of breath. Cardiovascular: Negative for chest pain and palpitations. Gastrointestinal: Negative for abdominal pain and vomiting. Reflux symptoms   Genitourinary: Negative for dysuria and hematuria. Musculoskeletal: Negative for arthralgias and back pain. Skin: Negative for color change and rash. Neurological: Negative for seizures and syncope. Psychiatric/Behavioral: The patient is nervous/anxious. All other systems reviewed and are negative. Objective:  Vitals:    09/05/23 1125   BP: 122/68   BP Location: Left arm   Patient Position: Sitting   Cuff Size: Adult   Pulse: 80   Resp: 16   SpO2: 98%   Weight: 69.6 kg (153 lb 6.4 oz)   Height: 5' 9" (1.753 m)     Body mass index is 22.65 kg/m². Physical Exam  Vitals and nursing note reviewed. Constitutional:       Appearance: Normal appearance. He is normal weight. HENT:      Head: Normocephalic and atraumatic. Right Ear: Tympanic membrane normal.      Left Ear: Tympanic membrane normal.      Nose: Congestion present. Mouth/Throat:      Mouth: Mucous membranes are moist.   Eyes:      Conjunctiva/sclera: Conjunctivae normal.      Pupils: Pupils are equal, round, and reactive to light. Neck:      Vascular: No carotid bruit.       Comments: Submandibular lymphadenopathy L > R  Cardiovascular:      Rate and Rhythm: Normal rate and regular rhythm. Heart sounds: Normal heart sounds. Pulmonary:      Effort: Pulmonary effort is normal.      Breath sounds: Normal breath sounds. Chest:   Breasts:     Right: Swelling present. Left: Swelling present. Abdominal:      General: Abdomen is flat. Bowel sounds are normal.      Palpations: Abdomen is soft. Musculoskeletal:         General: Normal range of motion. Cervical back: Normal range of motion. Lymphadenopathy:      Head:      Right side of head: Submandibular adenopathy present. Left side of head: Submandibular adenopathy present. Upper Body:      Right upper body: No axillary adenopathy. Left upper body: No axillary adenopathy. Lower Body: No right inguinal adenopathy. No left inguinal adenopathy. Skin:     General: Skin is warm and dry. Neurological:      General: No focal deficit present. Mental Status: He is alert and oriented to person, place, and time. Mental status is at baseline. Psychiatric:         Mood and Affect: Mood normal.         Behavior: Behavior normal.           RESULTS:  Hemoglobin A1C   Date/Time Value Ref Range Status   04/23/2018 09:29 AM 5.0 4.0 - 6.4 % Final     Comment:       The use of HbA1c to monitor glycemic status is   based on normal hemoglobin and HbA composition. This test should not be used in patients with   abnormal hemoglobin that affects the half life   of the red blood cell or the in vivo glycation   rates.      Hemoglobin   Date/Time Value Ref Range Status   07/25/2020 11:32 AM 15.6 12.0 - 17.0 g/dL Final     Hematocrit   Date/Time Value Ref Range Status   07/25/2020 11:32 AM 46.2 36.5 - 49.3 % Final     Platelets   Date/Time Value Ref Range Status   07/25/2020 11:32  149 - 390 Thousands/uL Final     TSH 3RD GENERATON   Date/Time Value Ref Range Status   07/25/2020 11:32 AM 1.345 0.358 - 3.740 uIU/mL Final     Comment: Using supplements with high doses of biotin 20 to more than 300 times greater than the adequate daily intake for adults of 30 mcg/day as established by the [place] [employer], can cause falsely depress results. Sodium   Date/Time Value Ref Range Status   07/25/2020 11:32  136 - 145 mmol/L Final     BUN   Date/Time Value Ref Range Status   07/25/2020 11:32 AM 6 5 - 25 mg/dL Final     Creatinine   Date/Time Value Ref Range Status   07/25/2020 11:32 AM 1.04 0.60 - 1.30 mg/dL Final     Comment:     Standardized to IDMS reference method      In chart    This note was created with voice recognition software. Phonic, grammatical and spelling errors may be present within the note as a result.

## 2023-09-14 ENCOUNTER — HOSPITAL ENCOUNTER (OUTPATIENT)
Dept: CT IMAGING | Facility: HOSPITAL | Age: 33
End: 2023-09-14
Payer: COMMERCIAL

## 2023-09-14 DIAGNOSIS — M35.01 SJOGREN'S SYNDROME WITH KERATOCONJUNCTIVITIS SICCA (HCC): ICD-10-CM

## 2023-09-14 DIAGNOSIS — R61 NIGHT SWEATS: ICD-10-CM

## 2023-09-14 DIAGNOSIS — R59.0 SUBMANDIBULAR LYMPHADENOPATHY: ICD-10-CM

## 2023-09-14 DIAGNOSIS — R22.1 NECK SWELLING: ICD-10-CM

## 2023-09-14 PROCEDURE — 70491 CT SOFT TISSUE NECK W/DYE: CPT

## 2023-09-14 PROCEDURE — G1004 CDSM NDSC: HCPCS

## 2023-09-14 RX ADMIN — IOHEXOL 85 ML: 350 INJECTION, SOLUTION INTRAVENOUS at 14:29

## 2023-10-20 ENCOUNTER — RA CDI HCC (OUTPATIENT)
Dept: OTHER | Facility: HOSPITAL | Age: 33
End: 2023-10-20

## 2024-01-08 ENCOUNTER — OFFICE VISIT (OUTPATIENT)
Dept: INTERNAL MEDICINE CLINIC | Facility: CLINIC | Age: 34
End: 2024-01-08
Payer: COMMERCIAL

## 2024-01-08 VITALS
BODY MASS INDEX: 23.82 KG/M2 | HEART RATE: 114 BPM | DIASTOLIC BLOOD PRESSURE: 80 MMHG | OXYGEN SATURATION: 99 % | HEIGHT: 69 IN | WEIGHT: 160.8 LBS | SYSTOLIC BLOOD PRESSURE: 130 MMHG

## 2024-01-08 DIAGNOSIS — M35.01 SJOGREN'S SYNDROME WITH KERATOCONJUNCTIVITIS SICCA (HCC): ICD-10-CM

## 2024-01-08 DIAGNOSIS — L60.9 ABNORMALITY OF NAIL SURFACE: Primary | ICD-10-CM

## 2024-01-08 DIAGNOSIS — R61 NIGHT SWEATS: ICD-10-CM

## 2024-01-08 PROCEDURE — 99214 OFFICE O/P EST MOD 30 MIN: CPT | Performed by: INTERNAL MEDICINE

## 2024-01-08 NOTE — PROGRESS NOTES
Assessment/Plan:     Patient is here for routine follow up, he was not able to get his labs done yet, but promises to go soon, continues to c/o night sweats and sleeps with temp of 68-69; sweats occur around 3 am - 6 am, denies any cp, sob, palpitations, neck swelling; obtain labs, ? Hypoglycemia; for now, monitor. Recent CT negative for suspicious lymph nodes.    Derm for nail changes;     Quality Measures:      Depression Screening and Follow-up Plan: Clincally patient does not have depression. No treatment is required.          Return in about 2 months (around 3/8/2024) for Annual physical.    No problem-specific Assessment & Plan notes found for this encounter.       Diagnoses and all orders for this visit:    Abnormality of nail surface  -     Ambulatory Referral to Dermatology; Future    Sjogren's syndrome with keratoconjunctivitis sicca (HCC)    Night sweats          Subjective:      Patient ID: Rita Finn is a 33 y.o. male.    Here for 2 month f/u.        ALLERGIES:  No Known Allergies    CURRENT MEDICATIONS:    Current Outpatient Medications:     cholecalciferol (VITAMIN D3) 1,000 units tablet, Take 1,000 Units by mouth daily 5,000 units daily, Disp: , Rfl:     esomeprazole (NexIUM) 20 mg capsule, Take 20 mg by mouth daily, Disp: , Rfl:     ACTIVE PROBLEM LIST:  Patient Active Problem List   Diagnosis    GERD (gastroesophageal reflux disease)    Sjogren's syndrome (HCC)       PAST MEDICAL HISTORY:  Past Medical History:   Diagnosis Date    Allergic     Anxiety     GERD (gastroesophageal reflux disease)     Sjogren's syndrome (HCC)        PAST SURGICAL HISTORY:  History reviewed. No pertinent surgical history.    FAMILY HISTORY:  History reviewed. No pertinent family history.    SOCIAL HISTORY:  Social History     Socioeconomic History    Marital status: Single     Spouse name: Not on file    Number of children: Not on file    Years of education: Not on file    Highest education level: Not on file  "  Occupational History    Not on file   Tobacco Use    Smoking status: Never    Smokeless tobacco: Never   Vaping Use    Vaping status: Never Used   Substance and Sexual Activity    Alcohol use: Yes     Alcohol/week: 8.0 standard drinks of alcohol     Types: 8 Cans of beer per week     Comment: socially    Drug use: Never    Sexual activity: Yes     Partners: Female     Birth control/protection: Condom Female   Other Topics Concern    Not on file   Social History Narrative    Not on file     Social Determinants of Health     Financial Resource Strain: Not on file   Food Insecurity: Not on file   Transportation Needs: Not on file   Physical Activity: Not on file   Stress: Not on file   Social Connections: Not on file   Intimate Partner Violence: Not on file   Housing Stability: Not on file       Review of Systems   Constitutional:  Positive for diaphoresis (night sweats). Negative for chills and fever.   HENT:  Negative for ear pain and sore throat.    Eyes:  Negative for pain and visual disturbance.   Respiratory:  Negative for cough and shortness of breath.    Cardiovascular:  Negative for chest pain and palpitations.   Gastrointestinal:  Negative for abdominal pain and vomiting.   Genitourinary:  Negative for dysuria and hematuria.   Musculoskeletal:  Negative for arthralgias and back pain.   Skin:  Positive for color change and rash.   Neurological:  Negative for seizures and syncope.   All other systems reviewed and are negative.        Objective:  Vitals:    01/08/24 1523 01/08/24 1541   BP: 140/98 130/80   BP Location: Left arm    Patient Position: Sitting    Cuff Size: Adult    Pulse: (!) 114    SpO2: 99%    Weight: 72.9 kg (160 lb 12.8 oz)    Height: 5' 9\" (1.753 m)      Body mass index is 23.75 kg/m².     Physical Exam  Vitals and nursing note reviewed.   Constitutional:       Appearance: Normal appearance. He is normal weight.   HENT:      Head: Normocephalic and atraumatic.   Cardiovascular:      Rate and " Rhythm: Normal rate.   Pulmonary:      Effort: Pulmonary effort is normal.   Musculoskeletal:         General: Normal range of motion.      Cervical back: Normal range of motion.   Skin:     General: Skin is warm and dry.   Neurological:      General: No focal deficit present.      Mental Status: He is alert and oriented to person, place, and time. Mental status is at baseline.   Psychiatric:         Mood and Affect: Mood normal.           RESULTS:  Hemoglobin A1C   Date/Time Value Ref Range Status   04/23/2018 09:29 AM 5.0 4.0 - 6.4 % Final     Comment:       The use of HbA1c to monitor glycemic status is   based on normal hemoglobin and HbA composition.  This test should not be used in patients with   abnormal hemoglobin that affects the half life   of the red blood cell or the in vivo glycation   rates.     Hemoglobin   Date/Time Value Ref Range Status   07/25/2020 11:32 AM 15.6 12.0 - 17.0 g/dL Final     Hematocrit   Date/Time Value Ref Range Status   07/25/2020 11:32 AM 46.2 36.5 - 49.3 % Final     Platelets   Date/Time Value Ref Range Status   07/25/2020 11:32  149 - 390 Thousands/uL Final     TSH 3RD GENERATON   Date/Time Value Ref Range Status   07/25/2020 11:32 AM 1.345 0.358 - 3.740 uIU/mL Final     Comment:       Using supplements with high doses of biotin 20 to more than 300 times greater than the adequate daily intake for adults of 30 mcg/day as established by the [place] [employer], can cause falsely depress results.     Sodium   Date/Time Value Ref Range Status   07/25/2020 11:32  136 - 145 mmol/L Final     BUN   Date/Time Value Ref Range Status   07/25/2020 11:32 AM 6 5 - 25 mg/dL Final     Creatinine   Date/Time Value Ref Range Status   07/25/2020 11:32 AM 1.04 0.60 - 1.30 mg/dL Final     Comment:     Standardized to IDMS reference method      In chart    This note was created with voice recognition software.  Phonic, grammatical and spelling errors may be present within the note as a  result.

## 2024-03-05 ENCOUNTER — APPOINTMENT (OUTPATIENT)
Age: 34
End: 2024-03-05
Payer: COMMERCIAL

## 2024-03-05 DIAGNOSIS — Z13.1 DIABETES MELLITUS SCREENING: ICD-10-CM

## 2024-03-05 DIAGNOSIS — Z13.6 ENCOUNTER FOR LIPID SCREENING FOR CARDIOVASCULAR DISEASE: ICD-10-CM

## 2024-03-05 DIAGNOSIS — K21.9 GASTROESOPHAGEAL REFLUX DISEASE WITHOUT ESOPHAGITIS: ICD-10-CM

## 2024-03-05 DIAGNOSIS — F41.9 ANXIETY: ICD-10-CM

## 2024-03-05 DIAGNOSIS — E55.9 VITAMIN D DEFICIENCY: ICD-10-CM

## 2024-03-05 DIAGNOSIS — M35.01 SJOGREN'S SYNDROME WITH KERATOCONJUNCTIVITIS SICCA (HCC): ICD-10-CM

## 2024-03-05 DIAGNOSIS — R61 NIGHT SWEATS: ICD-10-CM

## 2024-03-05 DIAGNOSIS — Z13.220 ENCOUNTER FOR LIPID SCREENING FOR CARDIOVASCULAR DISEASE: ICD-10-CM

## 2024-03-05 LAB
25(OH)D3 SERPL-MCNC: 51.6 NG/ML (ref 30–100)
ALBUMIN SERPL BCP-MCNC: 4.6 G/DL (ref 3.5–5)
ALP SERPL-CCNC: 47 U/L (ref 34–104)
ALT SERPL W P-5'-P-CCNC: 25 U/L (ref 7–52)
ANION GAP SERPL CALCULATED.3IONS-SCNC: 8 MMOL/L
AST SERPL W P-5'-P-CCNC: 22 U/L (ref 13–39)
BASOPHILS # BLD AUTO: 0.03 THOUSANDS/ÂΜL (ref 0–0.1)
BASOPHILS NFR BLD AUTO: 1 % (ref 0–1)
BILIRUB SERPL-MCNC: 0.65 MG/DL (ref 0.2–1)
BILIRUB UR QL STRIP: NEGATIVE
BUN SERPL-MCNC: 13 MG/DL (ref 5–25)
CALCIUM SERPL-MCNC: 9.9 MG/DL (ref 8.4–10.2)
CHLORIDE SERPL-SCNC: 103 MMOL/L (ref 96–108)
CHOLEST SERPL-MCNC: 200 MG/DL
CLARITY UR: CLEAR
CO2 SERPL-SCNC: 27 MMOL/L (ref 21–32)
COLOR UR: NORMAL
CREAT SERPL-MCNC: 0.98 MG/DL (ref 0.6–1.3)
EOSINOPHIL # BLD AUTO: 0.31 THOUSAND/ÂΜL (ref 0–0.61)
EOSINOPHIL NFR BLD AUTO: 5 % (ref 0–6)
ERYTHROCYTE [DISTWIDTH] IN BLOOD BY AUTOMATED COUNT: 13.1 % (ref 11.6–15.1)
EST. AVERAGE GLUCOSE BLD GHB EST-MCNC: 114 MG/DL
GFR SERPL CREATININE-BSD FRML MDRD: 100 ML/MIN/1.73SQ M
GLUCOSE P FAST SERPL-MCNC: 80 MG/DL (ref 65–99)
GLUCOSE UR STRIP-MCNC: NEGATIVE MG/DL
HBA1C MFR BLD: 5.6 %
HCT VFR BLD AUTO: 42.5 % (ref 36.5–49.3)
HDLC SERPL-MCNC: 57 MG/DL
HGB BLD-MCNC: 13.7 G/DL (ref 12–17)
HGB UR QL STRIP.AUTO: NEGATIVE
IMM GRANULOCYTES # BLD AUTO: 0.02 THOUSAND/UL (ref 0–0.2)
IMM GRANULOCYTES NFR BLD AUTO: 0 % (ref 0–2)
KETONES UR STRIP-MCNC: NEGATIVE MG/DL
LDLC SERPL CALC-MCNC: 129 MG/DL (ref 0–100)
LEUKOCYTE ESTERASE UR QL STRIP: NEGATIVE
LYMPHOCYTES # BLD AUTO: 1.52 THOUSANDS/ÂΜL (ref 0.6–4.47)
LYMPHOCYTES NFR BLD AUTO: 26 % (ref 14–44)
MCH RBC QN AUTO: 30.1 PG (ref 26.8–34.3)
MCHC RBC AUTO-ENTMCNC: 32.2 G/DL (ref 31.4–37.4)
MCV RBC AUTO: 93 FL (ref 82–98)
MONOCYTES # BLD AUTO: 0.36 THOUSAND/ÂΜL (ref 0.17–1.22)
MONOCYTES NFR BLD AUTO: 6 % (ref 4–12)
NEUTROPHILS # BLD AUTO: 3.56 THOUSANDS/ÂΜL (ref 1.85–7.62)
NEUTS SEG NFR BLD AUTO: 62 % (ref 43–75)
NITRITE UR QL STRIP: NEGATIVE
NRBC BLD AUTO-RTO: 0 /100 WBCS
PH UR STRIP.AUTO: 6.5 [PH]
PLATELET # BLD AUTO: 284 THOUSANDS/UL (ref 149–390)
PMV BLD AUTO: 10.5 FL (ref 8.9–12.7)
POTASSIUM SERPL-SCNC: 4.3 MMOL/L (ref 3.5–5.3)
PROT SERPL-MCNC: 7.6 G/DL (ref 6.4–8.4)
PROT UR STRIP-MCNC: NEGATIVE MG/DL
RBC # BLD AUTO: 4.55 MILLION/UL (ref 3.88–5.62)
SODIUM SERPL-SCNC: 138 MMOL/L (ref 135–147)
SP GR UR STRIP.AUTO: 1.01 (ref 1–1.03)
TRIGL SERPL-MCNC: 71 MG/DL
TSH SERPL DL<=0.05 MIU/L-ACNC: 2.33 UIU/ML (ref 0.45–4.5)
UROBILINOGEN UR STRIP-ACNC: <2 MG/DL
WBC # BLD AUTO: 5.8 THOUSAND/UL (ref 4.31–10.16)

## 2024-03-05 PROCEDURE — 36415 COLL VENOUS BLD VENIPUNCTURE: CPT

## 2024-03-05 PROCEDURE — 84443 ASSAY THYROID STIM HORMONE: CPT

## 2024-03-05 PROCEDURE — 83036 HEMOGLOBIN GLYCOSYLATED A1C: CPT

## 2024-03-05 PROCEDURE — 85025 COMPLETE CBC W/AUTO DIFF WBC: CPT

## 2024-03-05 PROCEDURE — 80061 LIPID PANEL: CPT

## 2024-03-05 PROCEDURE — 81003 URINALYSIS AUTO W/O SCOPE: CPT

## 2024-03-05 PROCEDURE — 80053 COMPREHEN METABOLIC PANEL: CPT

## 2024-03-05 PROCEDURE — 82306 VITAMIN D 25 HYDROXY: CPT

## 2024-03-11 ENCOUNTER — OFFICE VISIT (OUTPATIENT)
Dept: INTERNAL MEDICINE CLINIC | Facility: CLINIC | Age: 34
End: 2024-03-11
Payer: COMMERCIAL

## 2024-03-11 VITALS
OXYGEN SATURATION: 99 % | DIASTOLIC BLOOD PRESSURE: 90 MMHG | WEIGHT: 160.4 LBS | HEART RATE: 98 BPM | HEIGHT: 69 IN | TEMPERATURE: 98.6 F | RESPIRATION RATE: 18 BRPM | BODY MASS INDEX: 23.76 KG/M2 | SYSTOLIC BLOOD PRESSURE: 124 MMHG

## 2024-03-11 DIAGNOSIS — Z00.00 ANNUAL PHYSICAL EXAM: Primary | ICD-10-CM

## 2024-03-11 DIAGNOSIS — M35.01 SJOGREN'S SYNDROME WITH KERATOCONJUNCTIVITIS SICCA (HCC): ICD-10-CM

## 2024-03-11 PROBLEM — L40.9 PSORIASIS: Status: ACTIVE | Noted: 2024-03-11

## 2024-03-11 PROBLEM — I10 ESSENTIAL HYPERTENSION: Status: ACTIVE | Noted: 2018-05-29

## 2024-03-11 PROCEDURE — 99395 PREV VISIT EST AGE 18-39: CPT | Performed by: INTERNAL MEDICINE

## 2024-03-11 RX ORDER — CALCIPOTRIENE 50 UG/G
OINTMENT TOPICAL
COMMUNITY
Start: 2024-02-27

## 2024-03-11 NOTE — PROGRESS NOTES
ADULT ANNUAL PHYSICAL  [facility] INTERNAL MEDICINE [place]    NAME: Rita Finn  AGE: 33 y.o. SEX: male  : 1990     DATE: 3/11/2024     Assessment and Plan:     Problem List Items Addressed This Visit       Sjogren's syndrome (HCC)    Relevant Orders    Ambulatory Referral to Rheumatology    C-reactive protein    Sjogren's Antibodies     Other Visit Diagnoses       Annual physical exam    -  Primary          He denies issues, saw derm and was told he has psoriasis and will be starting treatment;     H/o Sjogren's, wondering if his night sweats are related, will obtain labs and have him see rheum;    Immunizations and preventive care screenings were discussed with patient today. Appropriate education was printed on patient's after visit summary.    Counseling:  Alcohol/drug use: discussed moderation in alcohol intake, the recommendations for healthy alcohol use, and avoidance of illicit drug use.  Dental Health: discussed importance of regular tooth brushing, flossing, and dental visits.  Injury prevention: discussed safety/seat belts, safety helmets, smoke detectors, carbon dioxide detectors, and smoking near bedding or upholstery.  Sexual health: discussed sexually transmitted diseases, partner selection, use of condoms, avoidance of unintended pregnancy, and contraceptive alternatives.  Exercise: the importance of regular exercise/physical activity was discussed. Recommend exercise 3-5 times per week for at least 30 minutes.       Depression Screening and Follow-up Plan: Clincally patient does not have depression. No treatment is required.         Return in about 1 year (around 3/11/2025) for Annual physical.     Chief Complaint:     Chief Complaint   Patient presents with    Physical Exam      History of Present Illness:     Adult Annual Physical   Patient here for a comprehensive physical exam. The patient reports no problems.    Diet and Physical  Activity  Diet/Nutrition: well balanced diet.   Exercise: no formal exercise.      Depression Screening  PHQ-2/9 Depression Screening           General Health  Sleep: sleeps well.   Hearing: normal - bilateral.  Vision: no vision problems.   Dental: regular dental visits.        Health  History of STDs?: no.    Advanced Care Planning  Do you have an advanced directive? no  Do you have a durable medical power of ? no  ACP document given to the patient? no     Review of Systems:     Review of Systems   Constitutional:  Negative for chills and fever.   HENT:  Negative for ear pain and sore throat.    Eyes:  Negative for pain and visual disturbance.   Respiratory:  Negative for cough and shortness of breath.    Cardiovascular:  Negative for chest pain and palpitations.   Gastrointestinal:  Negative for abdominal pain and vomiting.   Genitourinary:  Negative for dysuria and hematuria.   Musculoskeletal:  Negative for arthralgias and back pain.   Skin:  Negative for color change and rash.   Neurological:  Negative for seizures and syncope.   All other systems reviewed and are negative.     Past Medical History:     Past Medical History:   Diagnosis Date    Allergic     Anxiety     GERD (gastroesophageal reflux disease)     Sjogren's syndrome (HCC)       Past Surgical History:     History reviewed. No pertinent surgical history.   Social History:     Social History     Socioeconomic History    Marital status: Single     Spouse name: None    Number of children: None    Years of education: None    Highest education level: None   Occupational History    None   Tobacco Use    Smoking status: Never    Smokeless tobacco: Never   Vaping Use    Vaping status: Never Used   Substance and Sexual Activity    Alcohol use: Yes     Alcohol/week: 8.0 standard drinks of alcohol     Types: 8 Cans of beer per week     Comment: socially    Drug use: Never    Sexual activity: Yes     Partners: Female     Birth control/protection:  "Condom Female   Other Topics Concern    None   Social History Narrative    None     Social Determinants of Health     Financial Resource Strain: Not on file   Food Insecurity: Not on file   Transportation Needs: Not on file   Physical Activity: Not on file   Stress: Not on file   Social Connections: Not on file   Intimate Partner Violence: Not on file   Housing Stability: Not on file      Family History:     History reviewed. No pertinent family history.   Current Medications:     Current Outpatient Medications   Medication Sig Dispense Refill    calcipotriene (DOVONOX) 0.005 % ointment PRN      cholecalciferol (VITAMIN D3) 1,000 units tablet Take 1,000 Units by mouth daily 5,000 units daily      esomeprazole (NexIUM) 20 mg capsule Take 20 mg by mouth daily      hydrocortisone 2.5 % ointment PRN       No current facility-administered medications for this visit.      Allergies:     No Known Allergies   Physical Exam:     /90   Pulse 98   Temp 98.6 °F (37 °C) (Tympanic)   Resp 18   Ht 5' 9\" (1.753 m)   Wt 72.8 kg (160 lb 6.4 oz)   SpO2 99%   BMI 23.69 kg/m²     Physical Exam  Vitals and nursing note reviewed.   Constitutional:       General: He is not in acute distress.     Appearance: Normal appearance. He is well-developed and normal weight.   HENT:      Head: Normocephalic and atraumatic.   Eyes:      Conjunctiva/sclera: Conjunctivae normal.   Cardiovascular:      Rate and Rhythm: Normal rate and regular rhythm.      Heart sounds: Normal heart sounds. No murmur heard.  Pulmonary:      Effort: Pulmonary effort is normal. No respiratory distress.      Breath sounds: Normal breath sounds.   Abdominal:      General: Abdomen is flat. Bowel sounds are normal.      Palpations: Abdomen is soft.      Tenderness: There is no abdominal tenderness.   Musculoskeletal:         General: No swelling. Normal range of motion.      Cervical back: Normal range of motion and neck supple.      Right lower leg: No edema.     "  Left lower leg: No edema.   Lymphadenopathy:      Cervical: No cervical adenopathy.   Skin:     General: Skin is warm and dry.      Capillary Refill: Capillary refill takes less than 2 seconds.   Neurological:      General: No focal deficit present.      Mental Status: He is alert and oriented to person, place, and time. Mental status is at baseline.   Psychiatric:         Mood and Affect: Mood normal.          Alek Carrasco MD   [facility] INTERNAL MEDICINE [place]

## 2024-10-02 ENCOUNTER — OFFICE VISIT (OUTPATIENT)
Dept: INTERNAL MEDICINE CLINIC | Facility: CLINIC | Age: 34
End: 2024-10-02
Payer: COMMERCIAL

## 2024-10-02 VITALS
WEIGHT: 162 LBS | HEIGHT: 69 IN | RESPIRATION RATE: 17 BRPM | OXYGEN SATURATION: 98 % | BODY MASS INDEX: 23.99 KG/M2 | DIASTOLIC BLOOD PRESSURE: 82 MMHG | SYSTOLIC BLOOD PRESSURE: 134 MMHG | HEART RATE: 96 BPM

## 2024-10-02 DIAGNOSIS — R79.89 ELEVATED LFTS: ICD-10-CM

## 2024-10-02 DIAGNOSIS — R00.2 PALPITATIONS: Primary | ICD-10-CM

## 2024-10-02 PROCEDURE — 99214 OFFICE O/P EST MOD 30 MIN: CPT | Performed by: INTERNAL MEDICINE

## 2024-10-02 NOTE — PROGRESS NOTES
Ambulatory Visit  Name: Rita Finn      : 1990      MRN: [id number]  Encounter Provider: Alek Carrasco MD  Encounter Date: 10/2/2024   Encounter department: [facility]IC[facility] [place]    Assessment & Plan  Palpitations    Recently seen in the er for palpitations of 180 after waking up from bed, was sudden, he ended up going to the er and labs were done, LFT's were elevated,  EKG with T wave abnormality, no further episodes. He is avoiding caffeine.     Seeing cardiology end of the month. He had normal with normal stress test, echo and 24h hollter monitor, done in 2018.       Elevated LFTs    ALT> AST. He saw GI in the past and had an investigation in the past. Recheck for resolution.    Orders:    Comprehensive metabolic panel; Future      Depression Screening and Follow-up Plan: Patient's depression screening was positive with a PHQ-2 score of 3. Their PHQ-9 score was 8. Continue regular follow-up with their mental health provider who is managing their mental health condition(s).     History of Present Illness   Here for er f/u.        History obtained from : patient  Review of Systems   Constitutional:  Negative for chills and fever.   HENT:  Negative for ear pain and sore throat.    Eyes:  Negative for pain and visual disturbance.   Respiratory:  Negative for cough and shortness of breath.    Cardiovascular:  Positive for palpitations. Negative for chest pain.   Gastrointestinal:  Negative for abdominal pain and vomiting.   Genitourinary:  Negative for dysuria and hematuria.   Musculoskeletal:  Negative for arthralgias and back pain.   Skin:  Negative for color change and rash.   Neurological:  Negative for seizures and syncope.   All other systems reviewed and are negative.    Past Medical History   Past Medical History:   Diagnosis Date    Allergic     Anxiety     GERD (gastroesophageal reflux disease)     Sjogren's syndrome (HCC)      History reviewed. No pertinent surgical  "history.  History reviewed. No pertinent family history.  Current Outpatient Medications on File Prior to Visit   Medication Sig Dispense Refill    calcipotriene (DOVONOX) 0.005 % ointment PRN      cholecalciferol (VITAMIN D3) 1,000 units tablet Take 1,000 Units by mouth daily 5,000 units daily      esomeprazole (NexIUM) 20 mg capsule Take 20 mg by mouth daily      hydrocortisone 2.5 % ointment PRN       No current facility-administered medications on file prior to visit.   No Known Allergies       Objective   /82 (BP Location: Left arm, Patient Position: Sitting, Cuff Size: Adult)   Pulse 96   Resp 17   Ht 5' 9\" (1.753 m)   Wt 73.5 kg (162 lb)   SpO2 98%   BMI 23.92 kg/m²     Physical Exam  Vitals and nursing note reviewed.   Constitutional:       General: He is not in acute distress.     Appearance: He is well-developed.   HENT:      Head: Normocephalic and atraumatic.   Eyes:      Conjunctiva/sclera: Conjunctivae normal.   Cardiovascular:      Rate and Rhythm: Normal rate and regular rhythm.      Heart sounds: No murmur heard.  Pulmonary:      Effort: Pulmonary effort is normal. No respiratory distress.      Breath sounds: Normal breath sounds.   Abdominal:      Palpations: Abdomen is soft.      Tenderness: There is no abdominal tenderness.   Musculoskeletal:         General: No swelling.      Cervical back: Neck supple.   Skin:     General: Skin is warm and dry.      Capillary Refill: Capillary refill takes less than 2 seconds.   Neurological:      Mental Status: He is alert.   Psychiatric:         Mood and Affect: Mood normal.     I have spent a total time of 15 minutes in caring for this patient on the day of the visit/encounter including Patient and family education, Risk factor reductions, Impressions, and Reviewing / ordering tests, medicine, procedures  .  "

## 2024-10-03 ENCOUNTER — TELEPHONE (OUTPATIENT)
Dept: CARDIOLOGY CLINIC | Facility: CLINIC | Age: 34
End: 2024-10-03

## 2024-10-03 NOTE — TELEPHONE ENCOUNTER
----- Message from Alcira Chester MD sent at 10/3/2024 12:34 PM EDT -----  Regarding: Appointment  Please add this patient for October 19th for an appointment.    Patient was seen in the emergency room for palpitations.    Thank you.  Tel : [phone]

## 2024-10-18 ENCOUNTER — OFFICE VISIT (OUTPATIENT)
Dept: CARDIOLOGY CLINIC | Facility: CLINIC | Age: 34
End: 2024-10-18
Payer: COMMERCIAL

## 2024-10-18 VITALS
SYSTOLIC BLOOD PRESSURE: 124 MMHG | BODY MASS INDEX: 24.29 KG/M2 | HEART RATE: 98 BPM | OXYGEN SATURATION: 100 % | HEIGHT: 69 IN | RESPIRATION RATE: 16 BRPM | DIASTOLIC BLOOD PRESSURE: 90 MMHG | WEIGHT: 164 LBS

## 2024-10-18 DIAGNOSIS — R00.2 HEART PALPITATIONS: Primary | ICD-10-CM

## 2024-10-18 PROCEDURE — 99203 OFFICE O/P NEW LOW 30 MIN: CPT | Performed by: INTERNAL MEDICINE

## 2024-10-18 NOTE — PROGRESS NOTES
Cardiology Consultation     Rita Finn  [id number]  1990  [facility] [place] [address]    Patient presents for cardiology consultation and evaluation.  He has had history of palpitations and was evaluated in the emergency room at [facility] recently.  Patient was noted to have heart rates in the 150-160 range on his Apple watch.  Patient denies any history of thyroid problems.  No history of cardiac disease in the past.  Patient had a cardiac evaluation in 2018 which was overall unremarkable.  Patient does admit to some stress at times.  Patient also has history of Sjogren syndrome.  He is not on any prescription medications.  No history of rheumatic fever or mitral valve prolapse.  He denies any history of smoking.  He does consume alcohol on occasion.  He has noticed that he is unable to tolerate more than a few beers on occasion presently.    He does get lightheaded when standing up.  He tries to keep himself hydrated.  1. Heart palpitations          Patient Active Problem List   Diagnosis    GERD (gastroesophageal reflux disease)    Sjogren's syndrome (HCC)    Essential hypertension    Psoriasis     Past Medical History:   Diagnosis Date    Allergic     Anxiety     GERD (gastroesophageal reflux disease)     Sjogren's syndrome (HCC)      Social History     Socioeconomic History    Marital status: Single     Spouse name: Not on file    Number of children: Not on file    Years of education: Not on file    Highest education level: Not on file   Occupational History    Not on file   Tobacco Use    Smoking status: Never    Smokeless tobacco: Never   Vaping Use    Vaping status: Never Used   Substance and Sexual Activity    Alcohol use: Yes     Alcohol/week: 8.0 standard drinks of alcohol     Types: 8 Cans of beer per week     Comment: socially    Drug use: Never    Sexual  "activity: Yes     Partners: Female     Birth control/protection: Condom Male   Other Topics Concern    Not on file   Social History Narrative    Not on file     Social Determinants of Health     Financial Resource Strain: Not on file   Food Insecurity: Not on file   Transportation Needs: Not on file   Physical Activity: Not on file   Stress: Not on file   Social Connections: Unknown (6/18/2024)    Received from [facility]     How often do you feel lonely or isolated from those around you? (Adult - for ages 18 years and over): Not on file   Intimate Partner Violence: Not on file   Housing Stability: Not on file      No family history on file.  No past surgical history on file.    Current Outpatient Medications:     calcipotriene (DOVONOX) 0.005 % ointment, PRN, Disp: , Rfl:     cholecalciferol (VITAMIN D3) 1,000 units tablet, Take 1,000 Units by mouth daily 5,000 units daily, Disp: , Rfl:     esomeprazole (NexIUM) 20 mg capsule, Take 20 mg by mouth daily, Disp: , Rfl:     hydrocortisone 2.5 % ointment, PRN, Disp: , Rfl:   No Known Allergies  Vitals:    10/18/24 1417   BP: 124/90   BP Location: Left arm   Patient Position: Sitting   Cuff Size: Standard   Pulse: 98   Resp: 16   SpO2: 100%   Weight: 74.4 kg (164 lb)   Height: 5' 9\" (1.753 m)       Labs:  No visits with results within 2 Month(s) from this visit.   Latest known visit with results is:   Appointment on 03/05/2024   Component Date Value    WBC 03/05/2024 5.80     RBC 03/05/2024 4.55     Hemoglobin 03/05/2024 13.7     Hematocrit 03/05/2024 42.5     MCV 03/05/2024 93     MCH 03/05/2024 30.1     MCHC 03/05/2024 32.2     RDW 03/05/2024 13.1     MPV 03/05/2024 10.5     Platelets 03/05/2024 284     nRBC 03/05/2024 0     Segmented % 03/05/2024 62     Immature Grans % 03/05/2024 0     Lymphocytes % 03/05/2024 26     Monocytes % 03/05/2024 6     Eosinophils Relative 03/05/2024 5     Basophils Relative 03/05/2024 1     Absolute Neutrophils " 03/05/2024 3.56     Absolute Immature Grans 03/05/2024 0.02     Absolute Lymphocytes 03/05/2024 1.52     Absolute Monocytes 03/05/2024 0.36     Eosinophils Absolute 03/05/2024 0.31     Basophils Absolute 03/05/2024 0.03     TSH 3RD GENERATON 03/05/2024 2.325     Sodium 03/05/2024 138     Potassium 03/05/2024 4.3     Chloride 03/05/2024 103     CO2 03/05/2024 27     ANION GAP 03/05/2024 8     BUN 03/05/2024 13     Creatinine 03/05/2024 0.98     Glucose, Fasting 03/05/2024 80     Calcium 03/05/2024 9.9     AST 03/05/2024 22     ALT 03/05/2024 25     Alkaline Phosphatase 03/05/2024 47     Total Protein 03/05/2024 7.6     Albumin 03/05/2024 4.6     Total Bilirubin 03/05/2024 0.65     eGFR 03/05/2024 100     Hemoglobin A1C 03/05/2024 5.6     EAG 03/05/2024 114     Cholesterol 03/05/2024 200 (H)     Triglycerides 03/05/2024 71     HDL, Direct 03/05/2024 57     LDL Calculated 03/05/2024 129 (H)     Vit D, 25-Hydroxy 03/05/2024 51.6     Color, UA 03/05/2024 Light Yellow     Clarity, UA 03/05/2024 Clear     Specific Gravity, UA 03/05/2024 1.015     pH, UA 03/05/2024 6.5     Leukocytes, UA 03/05/2024 Negative     Nitrite, UA 03/05/2024 Negative     Protein, UA 03/05/2024 Negative     Glucose, UA 03/05/2024 Negative     Ketones, UA 03/05/2024 Negative     Urobilinogen, UA 03/05/2024 <2.0     Bilirubin, UA 03/05/2024 Negative     Occult Blood, UA 03/05/2024 Negative      Imaging: XR chest portable    Result Date: 9/30/2024  Narrative: Chest x-ray HISTORY: chest pain. TECHNIQUE: Single frontal view. FINDINGS: There is no lobar or segmental consolidation. No evidence of acute pulmonary edema. There is no visible pleural effusion.  The cardiopericardial silhouette is normal for size.  There is mild elevation of the LEFT hemidiaphragm.     Impression: IMPRESSION: Mild elevation of the LEFT hemidiaphragm. Workstation:[id number]      Review of Systems:  Review of Systems  REVIEW OF SYSTEMS:  Constitutional:  Denies fever or chills    Eyes:  Denies change in visual acuity   HENT:  Denies nasal congestion or sore throat   Respiratory:  Denies cough or shortness of breath   Cardiovascular:  Denies chest pain or edema   GI:  Denies abdominal pain, nausea, vomiting, bloody stools or diarrhea   :  Denies dysuria, frequency, difficulty in micturition and nocturia  Musculoskeletal:  Denies back pain or joint pain   Neurologic:  Denies headache, focal weakness or sensory changes   Endocrine:  Denies polyuria or polydipsia   Lymphatic:  Denies swollen glands   Psychiatric:  Denies depression or anxiety      Physical Exam:  Physical Exam  PHYSICAL EXAM:  General:  Patient is not in acute distress   Head: Normocephalic, Atraumatic.  HEENT:  Both pupils normal-size atraumatic, normocephalic, nonicteric  Neck:  JVP not raised. Trachea central. No carotid bruit  Respiratory:  normal breath sounds no crackles. no rhonchi  Cardiovascular:  Regular rate and rhythm no S3 no murmurs  GI:  Abdomen soft nontender. No organomegaly.   Lymphatic:  No cervical or inguinal lymphadenopathy  Neurologic:  Patient is awake alert, oriented . Grossly nonfocal  Extremities no edema    EKG done in the emergency room showed sinus rhythm and nonspecific T wave abnormality.    Discussion/Summary:  Patient with symptoms of palpitations and tachycardia.  No arrhythmias otherwise noted.  Patient has been monitoring his rhythm through his Apple Watch rate and rhythm monitor and has not noticed any arrhythmia notification.    Patient does admit to some anxiety and stress at times.  Importance of adequate hydration and to take his time to get up from lying or sitting position discussed.  If he were to have more symptoms of palpitations, will consider cardiac event monitor or Holter monitor.    Echocardiogram will be done to evaluate ejection fraction and rule out any evidence of structural heart disease.    Blood work through primary care physician.  TSH was normal.  Patient is  agreeable with the plan of care.

## 2024-11-04 ENCOUNTER — HOSPITAL ENCOUNTER (OUTPATIENT)
Dept: NON INVASIVE DIAGNOSTICS | Facility: CLINIC | Age: 34
Discharge: HOME | End: 2024-11-04
Payer: COMMERCIAL

## 2024-11-04 VITALS
BODY MASS INDEX: 24.29 KG/M2 | HEIGHT: 69 IN | HEART RATE: 91 BPM | WEIGHT: 164 LBS | DIASTOLIC BLOOD PRESSURE: 90 MMHG | SYSTOLIC BLOOD PRESSURE: 124 MMHG

## 2024-11-04 DIAGNOSIS — R00.2 HEART PALPITATIONS: ICD-10-CM

## 2024-11-04 LAB
BSA FOR ECHO PROCEDURE: 1.9 M2
SL CV LV EF: 60

## 2024-11-04 PROCEDURE — 93306 TTE W/DOPPLER COMPLETE: CPT

## 2024-11-04 PROCEDURE — 93306 TTE W/DOPPLER COMPLETE: CPT | Performed by: INTERNAL MEDICINE

## 2024-11-08 ENCOUNTER — TELEPHONE (OUTPATIENT)
Dept: CARDIOLOGY CLINIC | Facility: CLINIC | Age: 34
End: 2024-11-08

## 2024-11-08 NOTE — TELEPHONE ENCOUNTER
Received call from [place], he had been at work and wanted to go back over the results, went over the provider message about the results, pt verbally understood

## 2024-11-08 NOTE — TELEPHONE ENCOUNTER
----- Message from Cesario Hurd PA-C sent at 11/7/2024  5:10 PM EST -----  Please call patient to advise echocardiogram overall appears okay.  Heart pump function is within normal limits.  There was trace valvular leakiness which we will continue to monitor periodically.  These results can be discussed in more detail at next office visit.  Thank you.

## 2024-12-15 ENCOUNTER — HOSPITAL ENCOUNTER (EMERGENCY)
Facility: HOSPITAL | Age: 34
Discharge: HOME | End: 2024-12-15
Attending: EMERGENCY MEDICINE
Payer: COMMERCIAL

## 2024-12-15 VITALS
SYSTOLIC BLOOD PRESSURE: 124 MMHG | WEIGHT: 162 LBS | HEART RATE: 78 BPM | DIASTOLIC BLOOD PRESSURE: 83 MMHG | OXYGEN SATURATION: 100 % | RESPIRATION RATE: 18 BRPM | HEIGHT: 70 IN | TEMPERATURE: 97.9 F | BODY MASS INDEX: 23.19 KG/M2

## 2024-12-15 DIAGNOSIS — R00.2 PALPITATIONS: Primary | ICD-10-CM

## 2024-12-15 LAB
ANION GAP SERPL CALCULATED.3IONS-SCNC: 8 MMOL/L (ref 4–13)
BUN SERPL-MCNC: 10 MG/DL (ref 5–25)
CALCIUM SERPL-MCNC: 9.5 MG/DL (ref 8.4–10.2)
CHLORIDE SERPL-SCNC: 105 MMOL/L (ref 96–108)
CO2 SERPL-SCNC: 26 MMOL/L (ref 21–32)
CREAT SERPL-MCNC: 0.94 MG/DL (ref 0.6–1.3)
GFR SERPL CREATININE-BSD FRML MDRD: 105 ML/MIN/1.73SQ M
GLUCOSE SERPL-MCNC: 73 MG/DL (ref 65–140)
HCT VFR BLD AUTO: 42.8 % (ref 36.5–49.3)
HGB BLD-MCNC: 13.6 G/DL (ref 12–17)
MAGNESIUM SERPL-MCNC: 2 MG/DL (ref 1.9–2.7)
POTASSIUM SERPL-SCNC: 3.6 MMOL/L (ref 3.5–5.3)
SODIUM SERPL-SCNC: 139 MMOL/L (ref 135–147)

## 2024-12-15 PROCEDURE — 83735 ASSAY OF MAGNESIUM: CPT | Performed by: EMERGENCY MEDICINE

## 2024-12-15 PROCEDURE — 36415 COLL VENOUS BLD VENIPUNCTURE: CPT | Performed by: EMERGENCY MEDICINE

## 2024-12-15 PROCEDURE — 85014 HEMATOCRIT: CPT | Performed by: EMERGENCY MEDICINE

## 2024-12-15 PROCEDURE — 99285 EMERGENCY DEPT VISIT HI MDM: CPT | Performed by: EMERGENCY MEDICINE

## 2024-12-15 PROCEDURE — 80048 BASIC METABOLIC PNL TOTAL CA: CPT | Performed by: EMERGENCY MEDICINE

## 2024-12-15 PROCEDURE — 99285 EMERGENCY DEPT VISIT HI MDM: CPT

## 2024-12-15 PROCEDURE — 93005 ELECTROCARDIOGRAM TRACING: CPT

## 2024-12-15 PROCEDURE — 85018 HEMOGLOBIN: CPT | Performed by: EMERGENCY MEDICINE

## 2024-12-15 RX ORDER — METOPROLOL TARTRATE 25 MG/1
12.5 TABLET, FILM COATED ORAL ONCE
Status: COMPLETED | OUTPATIENT
Start: 2024-12-15 | End: 2024-12-15

## 2024-12-15 RX ORDER — METOPROLOL TARTRATE 25 MG/1
12.5 TABLET, FILM COATED ORAL EVERY 12 HOURS SCHEDULED
Qty: 10 TABLET | Refills: 0 | Status: SHIPPED | OUTPATIENT
Start: 2024-12-15

## 2024-12-15 RX ADMIN — METOPROLOL TARTRATE 12.5 MG: 25 TABLET, FILM COATED ORAL at 18:21

## 2024-12-15 NOTE — DISCHARGE INSTRUCTIONS
A  personal message from Dr. Yared Barnett,  Thank you so much for allowing me to care for you today.    I pride myself in the care and attention I give all my patients.  I hope you were a witness to this tonight.   If for any reason your condition does not improve or worsens, or you have a question that was not answered during your visit you can feel free to text me on my personal phone #  # [phone].   I will answer to your message and continue your care past your emergency room visit.     Please understand that although you are being discharged because your condition has been deemed stable and able to be managed on an outpatient setting. However your condition may worsen as part of the natural progression of the illness/condition, if this occurs please come back to the emergency department for a repeat evaluation.

## 2024-12-16 ENCOUNTER — VBI (OUTPATIENT)
Dept: INTERNAL MEDICINE CLINIC | Facility: CLINIC | Age: 34
End: 2024-12-16

## 2024-12-16 LAB
ATRIAL RATE: 90 BPM
P AXIS: 82 DEGREES
PR INTERVAL: 160 MS
QRS AXIS: 76 DEGREES
QRSD INTERVAL: 92 MS
QT INTERVAL: 334 MS
QTC INTERVAL: 408 MS
T WAVE AXIS: 69 DEGREES
VENTRICULAR RATE: 90 BPM

## 2024-12-16 PROCEDURE — 93010 ELECTROCARDIOGRAM REPORT: CPT | Performed by: STUDENT IN AN ORGANIZED HEALTH CARE EDUCATION/TRAINING PROGRAM

## 2024-12-16 NOTE — LETTER
[facility] INTERNAL MEDICINE [place]  [address]  [facility] [address]  [phone]    Date: 12/18/24    Rita Finn  [address]  [facility] [address]    Dear Rita:                                                                                                                                Thank you for choosing [facility] emergency department for care.  Your primary care provider wants to make sure that your ongoing medical care is being addressed. If you require follow up care as a result of your emergency department visit, there are a few things the practice would like you to know.                As part of the network's continuing commitment to caring for our patients, we have added more same day appointments and have extended office hours to meet your medical needs. After hours, on-call physicians are available via your primary care provider's main office line.               We encourage you to contact our office prior to seeking treatment to discuss your symptoms with the medical staff.  Together, we can determine the correct course of action.  A majority of non-emergent conditions such as: common cold, flu-like symptoms, fevers, strains/sprains, dislocations, minor burns, cuts and animal bites can be treated at [facility] facilities. Diagnostic testing is available at some sites.               Of course, if you are experiencing a life threatening medical emergency call 911 or proceed directly to the nearest emergency room.    Your nearest [facility] facility is conveniently located at:    [address]  [address]  [place] [address]  [phone]  SKIP THE WAIT  Conveniently offered at most [facility] locations  [place] your spot online at [email or web address] or on the [facility] Azael    Sincerely,    [facility]  Dept: [phone]

## 2024-12-16 NOTE — TELEPHONE ENCOUNTER
12/16/24 10:11 AM    Patient contacted post ED visit, first outreach attempt made. Message was left for patient to return a call to the VBI Department at [facility]: Phone [phone].    Thank you.  Rossy Worthington MA  [facility]

## 2024-12-17 NOTE — ED PROVIDER NOTES
Time reflects when diagnosis was documented in both MDM as applicable and the Disposition within this note       Time User Action Codes Description Comment    12/15/2024  6:55 PM ErickaYared hooper Add [R00.2] Palpitations           ED Disposition       ED Disposition   Discharge    Condition   Stable    Date/Time   Sun Dec 15, 2024  6:55 PM    Comment   Rita Finn discharge to home/self care.                   Assessment & Plan       Medical Decision Making  DDx including but not limited to: metabolic abnormality, cardiac arrhythmia, ACS, MI,  thyroid disease, PE, anxiety, adverse reaction.       Problems Addressed:  Palpitations: acute illness or injury    Amount and/or Complexity of Data Reviewed  Labs: ordered. Decision-making details documented in ED Course.    Risk  OTC drugs.  Prescription drug management.        ED Course as of 12/16/24 2327   Sun Dec 15, 2024   1732 EKG shows normal sinus rhythm with nonspecific T wave abnormalities normal axis.  Rate is 90   1841 Hemoglobin: 13.6   1841 Hematocrit: 42.8       Medications   metoprolol tartrate (LOPRESSOR) tablet 12.5 mg (12.5 mg Oral Given 12/15/24 1821)       ED Risk Strat Scores                                              History of Present Illness       Chief Complaint   Patient presents with    Palpitations     C/o palpitations since earlier this morning, states he had couple drinks last night.        Past Medical History:   Diagnosis Date    Allergic     Anxiety     GERD (gastroesophageal reflux disease)     Sjogren's syndrome (HCC)       History reviewed. No pertinent surgical history.   History reviewed. No pertinent family history.   Social History     Tobacco Use    Smoking status: Never    Smokeless tobacco: Never   Vaping Use    Vaping status: Never Used   Substance Use Topics    Alcohol use: Yes     Alcohol/week: 8.0 standard drinks of alcohol     Types: 8 Cans of beer per week     Comment: socially    Drug use: Never      E-Cigarette/Vaping     E-Cigarette Use Never User       E-Cigarette/Vaping Substances    Nicotine No     THC No     CBD No     Flavoring No     Other No     Unknown No       I have reviewed and agree with the history as documented.     Rita Finn is a 34 y.o.  year old male  Past Medical History:  No date: Allergic  No date: Anxiety  No date: GERD (gastroesophageal reflux disease)  No date: Sjogren's syndrome (HCC)  Social History    Tobacco Use      Smoking status: Never      Smokeless tobacco: Never    Vaping Use      Vaping status: Never Used    Alcohol use: Yes      Alcohol/week: 8.0 standard drinks of alcohol      Types: 8 Cans of beer per week      Comment: socially    Drug use: Never    Patient presents with:  Palpitations: C/o palpitations since earlier this morning, states he had couple drinks last night.   Patient has had issues with palpitations in the past but nothing discrete diagnosis has been made.  He detected he is tachycardia on his iWatch and it was between 150 and 160s.  However it did not show any warnings about atrial fibrillation.  At this time his heart rate is normal and there are no PVCs or PACs.                    History provided by:  Patient   used: No    Palpitations  Palpitations quality:  Fast  Associated symptoms: no back pain, no chest pain, no cough, no shortness of breath and no vomiting        Review of Systems   Constitutional:  Negative for chills and fever.   HENT:  Negative for ear pain and sore throat.    Eyes:  Negative for pain and visual disturbance.   Respiratory:  Negative for cough and shortness of breath.    Cardiovascular:  Positive for palpitations. Negative for chest pain.   Gastrointestinal:  Negative for abdominal pain and vomiting.   Genitourinary:  Negative for dysuria and hematuria.   Musculoskeletal:  Negative for arthralgias and back pain.   Skin:  Negative for color change and rash.   Neurological:  Negative for seizures and syncope.   All other systems  reviewed and are negative.          Objective       ED Triage Vitals [12/15/24 1727]   Temperature Pulse Blood Pressure Respirations SpO2 Patient Position - Orthostatic VS   97.9 °F (36.6 °C) 83 141/91 20 99 % Sitting      Temp Source Heart Rate Source BP Location FiO2 (%) Pain Score    Oral Monitor Left arm -- --      Vitals      Date and Time Temp Pulse SpO2 Resp BP Pain Score FACES Pain Rating User   12/15/24 1832 -- 78 100 % 18 124/83 -- -- EN   12/15/24 1727 97.9 °F (36.6 °C) 83 99 % 20 141/91 -- -- TO            Physical Exam  Vitals and nursing note reviewed.   Constitutional:       General: He is not in acute distress.     Appearance: Normal appearance. He is well-developed and normal weight.   HENT:      Head: Normocephalic and atraumatic.      Right Ear: External ear normal.      Left Ear: External ear normal.      Nose: Nose normal.      Mouth/Throat:      Mouth: Mucous membranes are moist.   Eyes:      Conjunctiva/sclera: Conjunctivae normal.   Cardiovascular:      Rate and Rhythm: Normal rate and regular rhythm.      Pulses: Normal pulses.      Heart sounds: Normal heart sounds. No murmur heard.  Pulmonary:      Effort: Pulmonary effort is normal. No respiratory distress.      Breath sounds: Normal breath sounds.   Abdominal:      Palpations: Abdomen is soft.      Tenderness: There is no abdominal tenderness.   Musculoskeletal:         General: No swelling.      Cervical back: Neck supple.   Skin:     General: Skin is warm and dry.      Capillary Refill: Capillary refill takes less than 2 seconds.   Neurological:      General: No focal deficit present.      Mental Status: He is alert and oriented to person, place, and time.   Psychiatric:         Mood and Affect: Mood normal.         Thought Content: Thought content normal.         Results Reviewed       Procedure Component Value Units Date/Time    Basic metabolic panel [[id number]] Collected: 12/15/24 1830    Lab Status: Final result Specimen: Blood  from Arm, Right Updated: 12/15/24 1851     Sodium 139 mmol/L      Potassium 3.6 mmol/L      Chloride 105 mmol/L      CO2 26 mmol/L      ANION GAP 8 mmol/L      BUN 10 mg/dL      Creatinine 0.94 mg/dL      Glucose 73 mg/dL      Calcium 9.5 mg/dL      eGFR 105 ml/min/1.73sq m     Narrative:      National Kidney Disease Foundation guidelines for Chronic Kidney Disease (CKD):     Stage 1 with normal or high GFR (GFR > 90 mL/min/1.73 square meters)    Stage 2 Mild CKD (GFR = 60-89 mL/min/1.73 square meters)    Stage 3A Moderate CKD (GFR = 45-59 mL/min/1.73 square meters)    Stage 3B Moderate CKD (GFR = 30-44 mL/min/1.73 square meters)    Stage 4 Severe CKD (GFR = 15-29 mL/min/1.73 square meters)    Stage 5 End Stage CKD (GFR <15 mL/min/1.73 square meters)  Note: GFR calculation is accurate only with a steady state creatinine    Magnesium [[id number]]  (Normal) Collected: 12/15/24 1830    Lab Status: Final result Specimen: Blood from Arm, Right Updated: 12/15/24 1851     Magnesium 2.0 mg/dL     Hemoglobin and hematocrit, blood [[id number]]  (Normal) Collected: 12/15/24 1830    Lab Status: Final result Specimen: Blood from Arm, Right Updated: 12/15/24 1837     Hemoglobin 13.6 g/dL      Hematocrit 42.8 %             No orders to display       Procedures    ED Medication and Procedure Management   Prior to Admission Medications   Prescriptions Last Dose Informant Patient Reported? Taking?   calcipotriene (DOVONOX) 0.005 % ointment  Self Yes No   Sig: PRN   cholecalciferol (VITAMIN D3) 1,000 units tablet  Self Yes No   Sig: Take 1,000 Units by mouth daily 5,000 units daily   esomeprazole (NexIUM) 20 mg capsule  Self Yes No   Sig: Take 20 mg by mouth daily   hydrocortisone 2.5 % ointment  Self Yes No   Sig: PRN      Facility-Administered Medications: None     Discharge Medication List as of 12/15/2024  6:56 PM        START taking these medications    Details   metoprolol tartrate (LOPRESSOR) 25 mg tablet Take 0.5 tablets (12.5  mg total) by mouth every 12 (twelve) hours, Starting Sun 12/15/2024, Normal           CONTINUE these medications which have NOT CHANGED    Details   calcipotriene (DOVONOX) 0.005 % ointment PRN, Historical Med      cholecalciferol (VITAMIN D3) 1,000 units tablet Take 1,000 Units by mouth daily 5,000 units daily, Historical Med      esomeprazole (NexIUM) 20 mg capsule Take 20 mg by mouth daily, Historical Med      hydrocortisone 2.5 % ointment PRN, Historical Med           No discharge procedures on file.  ED SEPSIS DOCUMENTATION   Time reflects when diagnosis was documented in both MDM as applicable and the Disposition within this note       Time User Action Codes Description Comment    12/15/2024  6:55 PM Yared Barnett Add [R00.2] Palpitations                  Yared Barnett MD  12/16/24 2165

## 2024-12-17 NOTE — TELEPHONE ENCOUNTER
12/17/24 9:34 AM    Patient contacted post ED visit, second outreach attempt made. Message was left for patient to return a call to the [facility] Department at [facility]: Phone [phone].    Thank you.  Rossy Worthington MA  [facility]

## 2024-12-18 NOTE — TELEPHONE ENCOUNTER
12/18/24 10:34 AM    Patient contacted post ED visit, phone outreaches were unsuccessful and a MyChart letter has been sent to the patient as follow-up.    Thank you.  Rossy Worthington MA  [facility]

## 2025-03-10 ENCOUNTER — RA CDI HCC (OUTPATIENT)
Dept: OTHER | Facility: HOSPITAL | Age: 35
End: 2025-03-10

## 2025-04-04 ENCOUNTER — OFFICE VISIT (OUTPATIENT)
Dept: INTERNAL MEDICINE CLINIC | Facility: CLINIC | Age: 35
End: 2025-04-04
Payer: COMMERCIAL

## 2025-04-04 ENCOUNTER — TELEPHONE (OUTPATIENT)
Age: 35
End: 2025-04-04

## 2025-04-04 VITALS
HEIGHT: 70 IN | WEIGHT: 162 LBS | SYSTOLIC BLOOD PRESSURE: 104 MMHG | RESPIRATION RATE: 16 BRPM | BODY MASS INDEX: 23.19 KG/M2 | OXYGEN SATURATION: 99 % | HEART RATE: 113 BPM | TEMPERATURE: 98.3 F | DIASTOLIC BLOOD PRESSURE: 80 MMHG

## 2025-04-04 DIAGNOSIS — M35.01 SJOGREN'S SYNDROME WITH KERATOCONJUNCTIVITIS SICCA (HCC): ICD-10-CM

## 2025-04-04 DIAGNOSIS — F32.2 SEVERE MAJOR DEPRESSIVE DISORDER (HCC): ICD-10-CM

## 2025-04-04 DIAGNOSIS — R20.0 NUMBNESS AND TINGLING: Primary | ICD-10-CM

## 2025-04-04 DIAGNOSIS — R20.2 NUMBNESS AND TINGLING: Primary | ICD-10-CM

## 2025-04-04 DIAGNOSIS — R20.2 PARESTHESIAS: ICD-10-CM

## 2025-04-04 PROCEDURE — 99214 OFFICE O/P EST MOD 30 MIN: CPT | Performed by: INTERNAL MEDICINE

## 2025-04-04 RX ORDER — METHYLPREDNISOLONE 4 MG/1
TABLET ORAL
Qty: 21 EACH | Refills: 0 | Status: SHIPPED | OUTPATIENT
Start: 2025-04-04

## 2025-04-04 NOTE — TELEPHONE ENCOUNTER
Pt called report he has tingling body pain. It gets worse when bending. Not as bad when laying on the bed.      NP Appt is on 5/6 at 9 with Lj. Pt stated he will use the andreia for the address.

## 2025-04-04 NOTE — PROGRESS NOTES
Name: Rita Finn      : 1990      MRN: [id number]  Encounter Provider: Alek Carrasco MD  Encounter Date: 2025   Encounter department: [facility] [place]  :  Assessment & Plan  Numbness and tingling  Reports in certain positions, he will have a numb or weak feeling, reports neck numbness, when he stands up, burning feeling to his left arm, back of neck,  when he bends forward, he will experience numbness. Reports burning and tingling. This has happened before, notably to lower back. 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 Reports he was traveling overseas, went to [facility] and [place], reports diarrheal illness.      R/o demyelinating disease. Does appear positional. Reports he has these episodes for long time, comes and goes.     Reports he was experiencing foot tremor in , saw a neurologist, MRI brain was normal. Hyperreflexia noted to b/l knees.  Orders:  •  CBC and differential; Future  •  Basic metabolic panel; Future  •  TSH, 3rd generation with Free T4 reflex; Future  •  Vitamin D 25 hydroxy; Future  •  Vitamin B12; Future  •  MRI brain wo contrast; Future  •  methylPREDNISolone 4 MG tablet therapy pack; Use as directed on package    Severe major depressive disorder (HCC)  Reports chronic. Denies acute complaints.         Sjogren's syndrome with keratoconjunctivitis sicca (HCC)  Continue current regimen.         Paresthesias  Reports in certain positions, he will have a numb or weak feeling, reports neck numbness, when he stands up, burning feeling to his left arm, back of neck,  when he bends forward, he will experience numbness. Reports burning and tingling. This has happened before, notably to lower back. 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 Reports he was traveling overseas, went to [facility] and [place], reports diarrheal illness.      R/o demyelinating disease. Does appear positional. Reports he has these episodes for long time, comes and goes.     Reports he was experiencing foot tremor in 2014, saw a neurologist, MRI brain was normal. Hyperreflexia noted to b/l knees.             Depression Screening and Follow-up Plan: Patient was screened for depression during today's encounter. They screened negative with a PHQ-2 score of 1.      History of Present Illness   Reports     Review of Systems   Constitutional:  Negative for chills and fever.   HENT:  Negative for ear pain and sore throat.    Eyes:  Negative for pain and visual disturbance.   Respiratory:  Negative for cough and shortness of breath.    Cardiovascular:  Negative for chest pain and palpitations.   Gastrointestinal:  Negative for abdominal pain and vomiting.   Genitourinary:  Negative for dysuria and hematuria.   Musculoskeletal:  Negative for arthralgias and back pain.   Skin:  Negative for color change and rash.   Neurological:  Negative for seizures and syncope.   All other systems reviewed and are negative.      Objective   /80 (BP Location: Left arm, Patient Position: Sitting, Cuff Size: Standard)   Pulse (!) 113   Temp 98.3 °F (36.8 °C) (Tympanic)   Resp 16   Ht 5' 10\" (1.778 m)   Wt 73.5 kg (162 lb)   SpO2 99%   BMI 23.24 kg/m²      Physical Exam  Vitals and nursing note reviewed.   Constitutional:       General: He is not in acute distress.     Appearance: He is well-developed.   HENT:      Head: Normocephalic and atraumatic.   Cardiovascular:      Rate and Rhythm: Normal rate.      Heart sounds: No murmur heard.  Pulmonary:      Effort: Pulmonary effort is normal. No respiratory distress.   Abdominal:      Tenderness: There is no abdominal tenderness.   Musculoskeletal:         General: No swelling.   Neurological:      Mental Status: He is oriented " to person, place, and time.      Coordination: Coordination is intact.      Deep Tendon Reflexes:      Reflex Scores:       Patellar reflexes are 3+ on the right side and 3+ on the left side.  Psychiatric:         Mood and Affect: Mood normal.

## 2025-04-04 NOTE — TELEPHONE ENCOUNTER
Pt called requesting an appointment with PCP. Pt c/o numbness and tingling, hands, bottom of feet, back and neck x 3 days.     Pt scheduled with PCP today at 12 noon. He will bring along insurance cards and photo ID to appointment.

## 2025-04-07 ENCOUNTER — OFFICE VISIT (OUTPATIENT)
Dept: NEUROLOGY | Facility: CLINIC | Age: 35
End: 2025-04-07
Payer: COMMERCIAL

## 2025-04-07 ENCOUNTER — APPOINTMENT (OUTPATIENT)
Age: 35
End: 2025-04-07
Payer: COMMERCIAL

## 2025-04-07 VITALS
SYSTOLIC BLOOD PRESSURE: 158 MMHG | HEIGHT: 70 IN | OXYGEN SATURATION: 100 % | DIASTOLIC BLOOD PRESSURE: 110 MMHG | WEIGHT: 162 LBS | HEART RATE: 84 BPM | BODY MASS INDEX: 23.19 KG/M2

## 2025-04-07 DIAGNOSIS — R20.2 NUMBNESS AND TINGLING: ICD-10-CM

## 2025-04-07 DIAGNOSIS — R79.89 ELEVATED LFTS: ICD-10-CM

## 2025-04-07 DIAGNOSIS — R20.2 PARESTHESIAS: Primary | ICD-10-CM

## 2025-04-07 DIAGNOSIS — R20.0 NUMBNESS AND TINGLING: ICD-10-CM

## 2025-04-07 PROCEDURE — 82306 VITAMIN D 25 HYDROXY: CPT

## 2025-04-07 PROCEDURE — 82607 VITAMIN B-12: CPT

## 2025-04-07 PROCEDURE — 99204 OFFICE O/P NEW MOD 45 MIN: CPT | Performed by: PSYCHIATRY & NEUROLOGY

## 2025-04-07 PROCEDURE — 80053 COMPREHEN METABOLIC PANEL: CPT

## 2025-04-07 PROCEDURE — 85025 COMPLETE CBC W/AUTO DIFF WBC: CPT

## 2025-04-07 PROCEDURE — 36415 COLL VENOUS BLD VENIPUNCTURE: CPT

## 2025-04-07 PROCEDURE — 84443 ASSAY THYROID STIM HORMONE: CPT

## 2025-04-07 NOTE — TELEPHONE ENCOUNTER
Phone call was disconnected to confirm pt's appt w/ Lj in [facility] on 4/15/25. Pt has mychart and active.

## 2025-04-07 NOTE — PROGRESS NOTES
Neurology Ambulatory Visit  Name: Rita Finn       : 1990       MRN: [id number]   Encounter Provider: Pat Cruz MD   Encounter Date: 2025  [facility]      Rita Finn is a 35 y.o. male.       Assessment & Plan  Paresthesias    Orders:    MRI cervical spine w wo contrast; Future    MRI brain MS wo and w contrast; Future      Differential diagnosis discussed with the patient possible secondary to vitamin B12 deficiency since patient had a vitamin B12 level done in [facility] and it was 50 but he has a repeat vitamin B12 level pending which he is going to do with today I advised him to follow-up with PCP and he may need vitamin B12 replacement in the form of injections but given that he has a prior history of Sjogren syndrome and has paresthesias and hyperreflexia we will check an MRI of the brain and C-spine with and without contrast and cancel the MRI of the brain without contrast that was ordered by the PCP, he will call me after the test to discuss the results he tells me that he does not have any allergies or contraindications to the contrast.    He was advised to eat a healthy nutritious diet, keep his blood pressure, cholesterol, sugar and weight under control also was advised to do breathing exercises and meditation and yoga.  To go to the hospital if has any worsening symptoms and call me otherwise to see me back in 6 months or sooner if needed and follow-up with the other physicians.  Subjective:      HISTORY OF PRESENT ILLNESS    35-year-old male with past medical history of Sjogren syndrome who is being referred by his family physician for paresthesias patient tells me that he recently went to [facility] and had a vitamin B12 level checked and it was 50 patient is a vegetarian he has been complaining of pins-and-needles for the last 4-week or 10 days initially he used to have the symptoms in his legs when he would cross his legs but now he has  been getting this numbness and tingling in his back on his arms and in his legs lasting for a few seconds and then resolves he has not had any episodes of loss of vision no headaches no speech difficulty, no focal weakness, he does have a prior history of anxiety but tells me that overall his anxiety has been well-controlled he has some dryness of the mouth secondary to Sjogren's but otherwise no other complaints he has had an MRI of the brain in 2014 secondary to tremor of his legs that was reported as normal, no bowel and bladder incontinence his appetite is good weight has been stable, no recent illnesses, no other complaints.             Past Medical History:    Past Medical History:   Diagnosis Date    Allergic     Anxiety     GERD (gastroesophageal reflux disease)     Sjogren's syndrome (HCC)      History reviewed. No pertinent surgical history.    Family History:  History reviewed. No pertinent family history.    Social History:  Social History     Tobacco Use    Smoking status: Never    Smokeless tobacco: Never   Vaping Use    Vaping status: Never Used   Substance Use Topics    Alcohol use: Yes     Alcohol/week: 8.0 standard drinks of alcohol     Types: 8 Cans of beer per week     Comment: socially    Drug use: Never      Living situation:    Work:      Allergies:  No Known Allergies    Medications:    Current Outpatient Medications:     esomeprazole (NexIUM) 20 mg capsule, Take 20 mg by mouth daily, Disp: , Rfl:     calcipotriene (DOVONOX) 0.005 % ointment, PRN (Patient not taking: Reported on 4/4/2025), Disp: , Rfl:     cholecalciferol (VITAMIN D3) 1,000 units tablet, Take 1,000 Units by mouth daily 5,000 units daily (Patient not taking: Reported on 4/4/2025), Disp: , Rfl:     hydrocortisone 2.5 % ointment, PRN (Patient not taking: Reported on 4/4/2025), Disp: , Rfl:     methylPREDNISolone 4 MG tablet therapy pack, Use as directed on package (Patient not taking: Reported on 4/7/2025), Disp: 21 each, Rfl:  "0    metoprolol tartrate (LOPRESSOR) 25 mg tablet, Take 0.5 tablets (12.5 mg total) by mouth every 12 (twelve) hours (Patient not taking: Reported on 4/4/2025), Disp: 10 tablet, Rfl: 0    Objective:  BP (!) 158/110 (BP Location: Left arm, Patient Position: Sitting, Cuff Size: Standard)   Pulse 84   Ht 5' 10\" (1.778 m)   Wt 73.5 kg (162 lb)   SpO2 100%   BMI 23.24 kg/m²      Labs  I have reviewed pertinent labs:  CBC:   Lab Results   Component Value Date    WBC 5.80 03/05/2024    RBC 4.55 03/05/2024    HGB 13.6 12/15/2024    HCT 42.8 12/15/2024    MCV 93 03/05/2024     03/05/2024    MCH 30.1 03/05/2024    MCHC 32.2 03/05/2024    RDW 13.1 03/05/2024    MPV 10.5 03/05/2024    NEUTROABS 3.56 03/05/2024     CMP:   Lab Results   Component Value Date    SODIUM 141 01/14/2025    K 4.4 01/14/2025     01/14/2025    CO2 27 01/14/2025    AGAP 10 01/14/2025    BUN 10 01/14/2025    CREATININE 0.97 01/14/2025    GLUC 81 01/14/2025    GLUF 80 03/05/2024    CALCIUM 10 01/14/2025    AST 24 01/14/2025    ALT 32 01/14/2025    ALKPHOS 51 01/14/2025    TP 7.7 01/14/2025    ALB 4.8 01/14/2025    TBILI 0.4 01/14/2025    EGFR 105 01/14/2025     Thyroid Studies:   Lab Results   Component Value Date    TGE7QMURXTTD 2.325 03/05/2024     Vitamin D Level   Lab Results   Component Value Date    AXYC32ALBUIJ 51.6 03/05/2024     Vitamin B12 No results found for: \"DDHVUFHE85\"  Folate No results found for: \"FOLATE\"           General Exam  GENERAL APPEARANCE:  No distress, alert, interactive and cooperative.  CARDIOVASCULAR: Warm and well perfused  LUNGS: normal work of breathing on room air  EXTREMITIES: no peripheral edema     Neurologic Exam  MENTAL STATE:  Orientation was normal to time, place and person without aphasia or apraxia. Recent and remote memory was intact.  Attention span and concentration were normal. Language testing was normal for comprehension, repetition, expression, and naming.     CRANIAL NERVES:  CN 2     "   visual fields full to confrontation.  CN 3, 4, 6  Pupils round, 4 mm in diameter, equally reactive to light. Lids symmetric; no ptosis. EOMs normal alignment, full range. No nystagmus.  CN 5  Facial sensation intact bilaterally.  CN 7  Normal and symmetric facial strength.   CN 8  Hearing intact to finger rub bilaterally.  CN 9  Palate elevates symmetrically.  CN 11  Normal strength of shoulder shrug and neck turning.  CN 12  Tongue midline, with normal bulk and strength.     MOTOR:  Motor exam was normal. Muscle bulk and tone were normal in both upper and lower extremities. Muscle strength was 5/5 in distal and proximal muscles in both upper and lower extremities. No fasciculations, tremor or other abnormal movements were present.     REFLEXES:  2+ in the upper extremities bilaterally and 3+ in bilateral lower extremities at the knees  Babinski: toes downgoing to plantar stimulation. No clonus.     SENSORY:  Intact to temperature and vibratory sensation in the upper and lower extremities bilaterally. Cortical function is intact.    COORDINATION:   Coordination exam was normal. In both upper extremities, finger-nose-finger was intact without dysmetria or overshoot.      GAIT:  Gait was normal. Station was stable with a normal base. Gait was stable with a normal arm swing and speed. Tandem gait was intact. No Romberg sign was present.    No spine tenderness.      ROS:  Review of Systems   Constitutional:  Negative for appetite change, fatigue and fever.   HENT: Negative.  Negative for hearing loss, tinnitus, trouble swallowing and voice change.    Eyes: Negative.  Negative for photophobia, pain and visual disturbance.   Respiratory: Negative.  Negative for shortness of breath.    Cardiovascular: Negative.  Negative for palpitations.   Gastrointestinal: Negative.  Negative for nausea and vomiting.   Endocrine: Negative.  Negative for cold intolerance.   Genitourinary: Negative.  Negative for dysuria, frequency and  urgency.   Musculoskeletal:  Negative for back pain, gait problem, myalgias, neck pain and neck stiffness.   Skin: Negative.  Negative for rash.   Allergic/Immunologic: Negative.    Neurological:  Positive for weakness and numbness. Negative for dizziness, tremors, seizures, syncope, facial asymmetry, speech difficulty, light-headedness and headaches.   Hematological: Negative.  Does not bruise/bleed easily.   Psychiatric/Behavioral: Negative.  Negative for confusion, hallucinations and sleep disturbance.    All other systems reviewed and are negative.      I have reviewed the past medical history, surgical history, social and family history, current medications, allergies vitals, review of systems, and updated this information as appropriate today.    Administrative Statements   The total amount of time spent with the patient and on chart review and documentation was  45minutes. Issues addressed during this clinic visit included overall management, medication counseling or monitoring, and counseling and coordination of care.         Pat Cruz MD

## 2025-04-08 ENCOUNTER — RESULTS FOLLOW-UP (OUTPATIENT)
Dept: INTERNAL MEDICINE CLINIC | Facility: CLINIC | Age: 35
End: 2025-04-08

## 2025-04-08 ENCOUNTER — TELEPHONE (OUTPATIENT)
Age: 35
End: 2025-04-08

## 2025-04-08 LAB
25(OH)D3 SERPL-MCNC: 39.2 NG/ML (ref 30–100)
ALBUMIN SERPL BCG-MCNC: 4.9 G/DL (ref 3.5–5)
ALP SERPL-CCNC: 58 U/L (ref 34–104)
ALT SERPL W P-5'-P-CCNC: 43 U/L (ref 7–52)
ANION GAP SERPL CALCULATED.3IONS-SCNC: 11 MMOL/L (ref 4–13)
AST SERPL W P-5'-P-CCNC: 30 U/L (ref 13–39)
BASOPHILS # BLD AUTO: 0.03 THOUSANDS/ÂΜL (ref 0–0.1)
BASOPHILS NFR BLD AUTO: 0 % (ref 0–1)
BILIRUB SERPL-MCNC: 0.54 MG/DL (ref 0.2–1)
BUN SERPL-MCNC: 11 MG/DL (ref 5–25)
CALCIUM SERPL-MCNC: 10 MG/DL (ref 8.4–10.2)
CHLORIDE SERPL-SCNC: 100 MMOL/L (ref 96–108)
CO2 SERPL-SCNC: 29 MMOL/L (ref 21–32)
CREAT SERPL-MCNC: 0.93 MG/DL (ref 0.6–1.3)
EOSINOPHIL # BLD AUTO: 0.24 THOUSAND/ÂΜL (ref 0–0.61)
EOSINOPHIL NFR BLD AUTO: 4 % (ref 0–6)
ERYTHROCYTE [DISTWIDTH] IN BLOOD BY AUTOMATED COUNT: 14 % (ref 11.6–15.1)
GFR SERPL CREATININE-BSD FRML MDRD: 105 ML/MIN/1.73SQ M
GLUCOSE SERPL-MCNC: 96 MG/DL (ref 65–140)
HCT VFR BLD AUTO: 42.5 % (ref 36.5–49.3)
HGB BLD-MCNC: 13.7 G/DL (ref 12–17)
IMM GRANULOCYTES # BLD AUTO: 0.02 THOUSAND/UL (ref 0–0.2)
IMM GRANULOCYTES NFR BLD AUTO: 0 % (ref 0–2)
LYMPHOCYTES # BLD AUTO: 1.5 THOUSANDS/ÂΜL (ref 0.6–4.47)
LYMPHOCYTES NFR BLD AUTO: 22 % (ref 14–44)
MCH RBC QN AUTO: 29.3 PG (ref 26.8–34.3)
MCHC RBC AUTO-ENTMCNC: 32.2 G/DL (ref 31.4–37.4)
MCV RBC AUTO: 91 FL (ref 82–98)
MONOCYTES # BLD AUTO: 0.36 THOUSAND/ÂΜL (ref 0.17–1.22)
MONOCYTES NFR BLD AUTO: 5 % (ref 4–12)
NEUTROPHILS # BLD AUTO: 4.55 THOUSANDS/ÂΜL (ref 1.85–7.62)
NEUTS SEG NFR BLD AUTO: 69 % (ref 43–75)
NRBC BLD AUTO-RTO: 0 /100 WBCS
PLATELET # BLD AUTO: 319 THOUSANDS/UL (ref 149–390)
PMV BLD AUTO: 10.6 FL (ref 8.9–12.7)
POTASSIUM SERPL-SCNC: 3.8 MMOL/L (ref 3.5–5.3)
PROT SERPL-MCNC: 8 G/DL (ref 6.4–8.4)
RBC # BLD AUTO: 4.68 MILLION/UL (ref 3.88–5.62)
SODIUM SERPL-SCNC: 140 MMOL/L (ref 135–147)
TSH SERPL DL<=0.05 MIU/L-ACNC: 2.63 UIU/ML (ref 0.45–4.5)
VIT B12 SERPL-MCNC: 488 PG/ML (ref 180–914)
WBC # BLD AUTO: 6.7 THOUSAND/UL (ref 4.31–10.16)

## 2025-04-08 NOTE — TELEPHONE ENCOUNTER
Brooklynn Fernández from Prior authorization called to see if she should continue with the authorization for the pt;s MRI w/o contrast. She stated it has been approved, but she wasn't sure if we were also requesting prior authorization.     She would like a call back at [phone] before moving forward or canceling the prior authorization.      Please assist    Thank you

## 2025-04-16 ENCOUNTER — HOSPITAL ENCOUNTER (OUTPATIENT)
Dept: MRI IMAGING | Facility: HOSPITAL | Age: 35
Discharge: HOME | End: 2025-04-16
Attending: PSYCHIATRY & NEUROLOGY
Payer: COMMERCIAL

## 2025-04-16 DIAGNOSIS — R20.2 PARESTHESIAS: ICD-10-CM

## 2025-04-16 PROCEDURE — 72156 MRI NECK SPINE W/O & W/DYE: CPT

## 2025-04-16 PROCEDURE — 70553 MRI BRAIN STEM W/O & W/DYE: CPT

## 2025-04-16 PROCEDURE — A9585 GADOBUTROL INJECTION: HCPCS | Performed by: PSYCHIATRY & NEUROLOGY

## 2025-04-16 RX ORDER — GADOBUTROL 604.72 MG/ML
7 INJECTION INTRAVENOUS
Status: COMPLETED | OUTPATIENT
Start: 2025-04-16 | End: 2025-04-16

## 2025-04-16 RX ADMIN — GADOBUTROL 7 ML: 604.72 INJECTION INTRAVENOUS at 19:52

## 2025-06-13 ENCOUNTER — RA CDI HCC (OUTPATIENT)
Dept: OTHER | Facility: HOSPITAL | Age: 35
End: 2025-06-13

## 2025-06-20 ENCOUNTER — OFFICE VISIT (OUTPATIENT)
Dept: INTERNAL MEDICINE CLINIC | Facility: CLINIC | Age: 35
End: 2025-06-20
Payer: COMMERCIAL

## 2025-06-20 VITALS
SYSTOLIC BLOOD PRESSURE: 124 MMHG | DIASTOLIC BLOOD PRESSURE: 78 MMHG | HEART RATE: 90 BPM | OXYGEN SATURATION: 98 % | WEIGHT: 160 LBS | RESPIRATION RATE: 17 BRPM | HEIGHT: 70 IN | BODY MASS INDEX: 22.9 KG/M2

## 2025-06-20 DIAGNOSIS — R00.2 PALPITATIONS: ICD-10-CM

## 2025-06-20 DIAGNOSIS — R20.2 PARESTHESIAS: ICD-10-CM

## 2025-06-20 DIAGNOSIS — Z00.00 ANNUAL PHYSICAL EXAM: Primary | ICD-10-CM

## 2025-06-20 PROBLEM — F32.2 SEVERE MAJOR DEPRESSIVE DISORDER (HCC): Status: RESOLVED | Noted: 2025-04-04 | Resolved: 2025-06-20

## 2025-06-20 PROBLEM — E78.5 DYSLIPIDEMIA: Status: ACTIVE | Noted: 2025-01-14

## 2025-06-20 PROBLEM — Z86.19 HISTORY OF HELICOBACTER PYLORI INFECTION: Status: ACTIVE | Noted: 2018-04-23

## 2025-06-20 PROCEDURE — 99214 OFFICE O/P EST MOD 30 MIN: CPT | Performed by: INTERNAL MEDICINE

## 2025-06-20 PROCEDURE — 99395 PREV VISIT EST AGE 18-39: CPT | Performed by: INTERNAL MEDICINE

## 2025-06-20 RX ORDER — METOPROLOL SUCCINATE 25 MG/1
12.5 TABLET, EXTENDED RELEASE ORAL DAILY
Qty: 45 TABLET | Refills: 0 | Status: SHIPPED | OUTPATIENT
Start: 2025-06-20 | End: 2025-09-18

## 2025-06-20 NOTE — PROGRESS NOTES
Adult Annual Physical  Name: Rita Finn      : 1990      MRN: [id number]  Encounter Provider: Alek Carrasco MD  Encounter Date: 2025   Encounter department: [facility] [place]    :  Assessment & Plan  Annual physical exam  Completed. Reports paraesthesias about the same. He will call neurology.       Palpitations  Episodic, comes and goes, notices it with certain foods, like mayonnaise and vinegar. Denies cp, sob. Has a couple episodes of palpitations when drinking alcohol. We will continue metoprolol.   Orders:    metoprolol succinate (Toprol XL) 25 mg 24 hr tablet; Take 0.5 tablets (12.5 mg total) by mouth daily    Paresthesias  About the same. Call neurology. MRI brain and C spine normal. B 12 normal.      Preventive Screenings:  - Diabetes Screening: orders placed  - Cholesterol Screening: orders placed   - Hepatitis C screening: screening up-to-date   - HIV screening: patient declines   - Colon cancer screening: screening not indicated   - Lung cancer screening: screening not indicated   - Prostate cancer screening: screening not indicated     Immunizations:  - Immunizations due: Tdap    Counseling/Anticipatory Guidance:    - Diet: discussed recommendations for a healthy/well-balanced diet.   - Exercise: the importance of regular exercise/physical activity was discussed. Recommend exercise 3-5 times per week for at least 30 minutes.   - Injury prevention: discussed safety/seat belts, safety helmets, smoke detectors, carbon monoxide detectors, and smoking near bedding or upholstery.       Depression Screening and Follow-up Plan: Patient's depression screening was positive with a PHQ-9 score of 11.   Clincally patient does not have depression. No treatment is required.         History of Present Illness     Adult Annual Physical:  Patient presents for annual physical.     Diet and Physical Activity:  - Diet/Nutrition: no special diet.  - Exercise:  "walking.    Depression Screening:    - PHQ-9 Score: 11    General Health:  - Sleep: 4-6 hours of sleep on average.  - Hearing: normal hearing bilateral ears.  - Vision: no vision problems.  - Dental: brushes teeth once daily and brushes teeth twice daily.    /GYN Health:  - Follows with GYN: no.   - History of STDs: no     Health:  - History of STDs: no.   - Urinary symptoms: none.     Advanced Care Planning:  - Has an advanced directive?: no    - Has a durable medical POA?: no    - ACP document given to patient?: no      Review of Systems   Constitutional:  Negative for chills and fever.   HENT:  Negative for ear pain and sore throat.    Eyes:  Negative for pain and visual disturbance.   Respiratory:  Negative for cough and shortness of breath.    Cardiovascular:  Negative for chest pain and palpitations.   Gastrointestinal:  Negative for abdominal pain and vomiting.   Genitourinary:  Negative for dysuria and hematuria.   Musculoskeletal:  Negative for arthralgias and back pain.   Skin:  Negative for color change and rash.   Neurological:  Negative for seizures and syncope.   All other systems reviewed and are negative.    Past Medical History   Past Medical History[1]  Past Surgical History[2]  Family History[3]   reports that he has never smoked. He has never used smokeless tobacco. He reports current alcohol use of about 8.0 standard drinks of alcohol per week. He reports that he does not use drugs.  Current Outpatient Medications   Medication Instructions    esomeprazole (NEXIUM) 20 mg, Daily    metoprolol succinate (TOPROL XL) 12.5 mg, Oral, Daily   Allergies[4]     Objective   /78 (BP Location: Left arm, Patient Position: Sitting, Cuff Size: Adult)   Pulse 90   Resp 17   Ht 5' 10\" (1.778 m)   Wt 72.6 kg (160 lb)   SpO2 98%   BMI 22.96 kg/m²     Physical Exam  Vitals and nursing note reviewed.   Constitutional:       General: He is not in acute distress.     Appearance: He is well-developed. "   HENT:      Head: Normocephalic and atraumatic.     Eyes:      Conjunctiva/sclera: Conjunctivae normal.       Cardiovascular:      Rate and Rhythm: Normal rate and regular rhythm.      Heart sounds: No murmur heard.  Pulmonary:      Effort: Pulmonary effort is normal. No respiratory distress.      Breath sounds: Normal breath sounds.   Abdominal:      Palpations: Abdomen is soft.      Tenderness: There is no abdominal tenderness.     Musculoskeletal:         General: No swelling.     Skin:     General: Skin is warm and dry.     Neurological:      General: No focal deficit present.      Mental Status: He is alert and oriented to person, place, and time. Mental status is at baseline.     Psychiatric:         Mood and Affect: Mood normal.              [1]   Past Medical History:  Diagnosis Date    Allergic     Anxiety     GERD (gastroesophageal reflux disease)     Sjogren's syndrome (HCC)    [2] No past surgical history on file.  [3] No family history on file.  [4] No Known Allergies